# Patient Record
Sex: FEMALE | Race: WHITE | NOT HISPANIC OR LATINO | URBAN - METROPOLITAN AREA
[De-identification: names, ages, dates, MRNs, and addresses within clinical notes are randomized per-mention and may not be internally consistent; named-entity substitution may affect disease eponyms.]

---

## 2017-02-21 PROBLEM — R55 SYNCOPE: Status: ACTIVE | Noted: 2017-02-21

## 2017-05-08 ENCOUNTER — IMPORTED ENCOUNTER (OUTPATIENT)
Dept: URBAN - METROPOLITAN AREA CLINIC 1 | Facility: CLINIC | Age: 57
End: 2017-05-08

## 2017-05-08 PROBLEM — Z79.84: Noted: 2017-05-08

## 2017-05-08 PROBLEM — H25.813: Noted: 2017-05-08

## 2017-05-08 PROBLEM — E11.3521: Noted: 2017-05-08

## 2017-05-08 PROBLEM — E08.3513: Noted: 2017-05-08

## 2017-05-08 PROCEDURE — 92004 COMPRE OPH EXAM NEW PT 1/>: CPT

## 2017-05-08 NOTE — PATIENT DISCUSSION
1.  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 2.  DM Type II (Oral Medication) with Proliferative Diabetic Retinopathy with no Macular Edema OS: With Gross NVD and tractional band from the disc to the lens. Will refer to Retinal Specialist this week. Discussed the pathophysiology of diabetes and its effect on the eye and risk of blindness. Stressed the importance of strong glucose control. Advised of importance of at least yearly dilated examinations but to contact us immediately for any problems or concerns. 3. Cataract OU: Observe for now without intervention. The patient was advised to contact us if any change or worsening of visionReturn for an appointment in 6 months 10/DFE with Dr. Derrick Andrew.

## 2017-07-10 PROBLEM — G45.9 TIA (TRANSIENT ISCHEMIC ATTACK): Status: ACTIVE | Noted: 2017-07-10

## 2017-07-10 PROBLEM — R42 DIZZINESS: Status: ACTIVE | Noted: 2017-07-10

## 2018-05-27 PROBLEM — R53.1 LEFT-SIDED WEAKNESS: Status: ACTIVE | Noted: 2018-05-27

## 2018-07-02 ENCOUNTER — TELEPHONE (OUTPATIENT)
Dept: OBGYN CLINIC | Age: 58
End: 2018-07-02

## 2018-07-02 NOTE — TELEPHONE ENCOUNTER
Noted clear vaginal discharge when wiping a few days ago. Advised patient that clear discharge is normal. Would like to schedule for well woman exam. Transferred to [facility] for scheduling.

## 2018-09-24 ENCOUNTER — IMPORTED ENCOUNTER (OUTPATIENT)
Dept: URBAN - METROPOLITAN AREA CLINIC 1 | Facility: CLINIC | Age: 58
End: 2018-09-24

## 2018-09-24 NOTE — PATIENT DISCUSSION
Pt came in for emergency visit and mid exam while being worked up by tech patient started having a panic attack and was very disoriented. EMS was called and came on site and took patient via ambulance to the emergency room.

## 2018-09-26 ENCOUNTER — IMPORTED ENCOUNTER (OUTPATIENT)
Dept: URBAN - METROPOLITAN AREA CLINIC 1 | Facility: CLINIC | Age: 58
End: 2018-09-26

## 2018-09-26 PROBLEM — H25.813: Noted: 2018-09-26

## 2018-09-26 PROBLEM — E11.3512: Noted: 2018-09-26

## 2018-09-26 PROBLEM — E11.3521: Noted: 2018-09-26

## 2018-09-26 PROCEDURE — 92014 COMPRE OPH EXAM EST PT 1/>: CPT

## 2018-09-26 NOTE — PATIENT DISCUSSION
DM Type II with Proliferative Diabetic Retinopathy with Macular Edema OS:  Discussed the pathophysiology of diabetes and its effect on the eye and risk of blindness. Stressed the importance of strong glucose control. Advised of importance of at least yearly dilated examinations but to contact us immediately for any problems or concerns.

## 2018-09-26 NOTE — PATIENT DISCUSSION
1.  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 2.  DM Type II (currently not on any medication) with Proliferative Diabetic Retinopathy with Macular Edema OS:  Discussed the pathophysiology of diabetes and its effect on the eye and risk of blindness. Stressed the importance of strong glucose control. Advised of importance of at least yearly dilated examinations but to contact us immediately for any problems or concerns. 3. Cataracts OU (w/ PSC OD) -- Observe for now without intervention until cleared by retinal specialist. The patient was advised to contact us if any change or worsening of visionReturn for an appointment in 6 MO for a 10 / [address] with Dr. Viviana Nice. Refer back to Dr. Juanpablo Yan within 2-3 WKS for PDR / DME OU treatment & management & cataract surgery eval / clearance.

## 2018-10-29 ENCOUNTER — OFFICE VISIT (OUTPATIENT)
Dept: OBGYN CLINIC | Age: 58
End: 2018-10-29

## 2018-10-29 VITALS
WEIGHT: 123.6 LBS | RESPIRATION RATE: 16 BRPM | DIASTOLIC BLOOD PRESSURE: 65 MMHG | SYSTOLIC BLOOD PRESSURE: 120 MMHG | OXYGEN SATURATION: 100 % | HEART RATE: 109 BPM | TEMPERATURE: 98.4 F | BODY MASS INDEX: 20.59 KG/M2 | HEIGHT: 65 IN

## 2018-10-29 DIAGNOSIS — Z01.419 WELL WOMAN EXAM WITH ROUTINE GYNECOLOGICAL EXAM: Primary | ICD-10-CM

## 2018-10-29 DIAGNOSIS — Z01.419 WELL WOMAN EXAM WITH ROUTINE GYNECOLOGICAL EXAM: ICD-10-CM

## 2018-10-29 NOTE — PROGRESS NOTES
Subjective:  
66 y.o. female for Well Woman Check. No LMP recorded. Patient is not currently having periods (Reason: Menopause). Social History: single partner, contraception - none. Pertinent past medical hstory: no history of HTN, DVT, CAD, DM, liver disease, migraines or smoking. ROS:  Feeling well. No dyspnea or chest pain on exertion. No abdominal pain, change in bowel habits, black or bloody stools. No urinary tract symptoms. GYN ROS: no breast pain or new or enlarging lumps on self exam, no vaginal bleeding, no discharge or pelvic pain, no hot flashes. No neurological complaints. Objective:  
 
Visit Vitals /65 (BP 1 Location: Left arm, BP Patient Position: Sitting) Pulse (!) 109 Temp 98.4 °F (36.9 °C) (Oral) Resp 16 Ht 5' 5\" (1.651 m) Wt 123 lb 9.6 oz (56.1 kg) SpO2 100% BMI 20.57 kg/m² The patient appears well, alert, oriented x 3, in no distress. ENT normal.  Neck supple. No adenopathy or thyromegaly. CARMEN. Lungs are clear, good air entry, no wheezes, rhonchi or rales. S1 and S2 normal, no murmurs, regular rate and rhythm. Abdomen soft without tenderness, guarding, mass or organomegaly. Extremities show no edema, normal peripheral pulses. Neurological is normal, no focal findings. BREAST EXAM: breasts appear normal, no suspicious masses, no skin or nipple changes or axillary nodes PELVIC EXAM: normal external genitalia, vulva, vagina, cervix, uterus and adnexa Assessment/Plan:  
well woman 
mammogram 
pap smear 
return annually or prn 
  ICD-10-CM ICD-9-CM 1. Well woman exam with routine gynecological exam Z01.419 V72.31 PAP IG, APTIMA HPV AND RFX 16/18,45 ([id number]) PAP IG, APTIMA HPV AND RFX 16/18,45 ([id number]) Sophie Glynn

## 2018-10-29 NOTE — PATIENT INSTRUCTIONS
Well Visit, Women 48 to 72: Care Instructions Your Care Instructions Physical exams can help you stay healthy. Your doctor has checked your overall health and may have suggested ways to take good care of yourself. He or she also may have recommended tests. At home, you can help prevent illness with healthy eating, regular exercise, and other steps. Follow-up care is a key part of your treatment and safety. Be sure to make and go to all appointments, and call your doctor if you are having problems. It's also a good idea to know your test results and keep a list of the medicines you take. How can you care for yourself at home? · Reach and stay at a healthy weight. This will lower your risk for many problems, such as obesity, diabetes, heart disease, and high blood pressure. · Get at least 30 minutes of exercise on most days of the week. Walking is a good choice. You also may want to do other activities, such as running, swimming, cycling, or playing tennis or team sports. · Do not smoke. Smoking can make health problems worse. If you need help quitting, talk to your doctor about stop-smoking programs and medicines. These can increase your chances of quitting for good. · Protect your skin from too much sun. When you're outdoors from 10 a.m. to 4 p.m., stay in the shade or cover up with clothing and a hat with a wide brim. Wear sunglasses that block UV rays. Even when it's cloudy, put broad-spectrum sunscreen (SPF 30 or higher) on any exposed skin. · See a dentist one or two times a year for checkups and to have your teeth cleaned. · Wear a seat belt in the car. · Limit alcohol to 1 drink a day. Too much alcohol can cause health problems. Follow your doctor's advice about when to have certain tests. These tests can spot problems early. · Cholesterol.  Your doctor will tell you how often to have this done based on your age, family history, or other things that can increase your risk for heart attack and stroke. · Blood pressure. Have your blood pressure checked during a routine doctor visit. Your doctor will tell you how often to check your blood pressure based on your age, your blood pressure results, and other factors. · Mammogram. Ask your doctor how often you should have a mammogram, which is an X-ray of your breasts. A mammogram can spot breast cancer before it can be felt and when it is easiest to treat. · Pap test and pelvic exam. Ask your doctor how often you should have a Pap test. You may not need to have a Pap test as often as you used to. · Vision. Have your eyes checked every year or two or as often as your doctor suggests. Some experts recommend that you have yearly exams for glaucoma and other age-related eye problems starting at age 48. · Hearing. Tell your doctor if you notice any change in your hearing. You can have tests to find out how well you hear. · Diabetes. Ask your doctor whether you should have tests for diabetes. · Colon cancer. You should begin tests for colon cancer at age 48. You may have one of several tests. Your doctor will tell you how often to have tests based on your age and risk. Risks include whether you already had a precancerous polyp removed from your colon or whether your parents, sisters and brothers, or children have had colon cancer. · Thyroid disease. Talk to your doctor about whether to have your thyroid checked as part of a regular physical exam. Women have an increased chance of a thyroid problem. · Osteoporosis. You should begin tests for bone density at age 72. If you are younger than 72, ask your doctor whether you have factors that may increase your risk for this disease. You may want to have this test before age 72. · Heart attack and stroke risk. At least every 4 to 6 years, you should have your risk for heart attack and stroke assessed.  Your doctor uses factors such as your age, blood pressure, cholesterol, and whether you smoke or have diabetes to show what your risk for a heart attack or stroke is over the next 10 years. When should you call for help? Watch closely for changes in your health, and be sure to contact your doctor if you have any problems or symptoms that concern you. Where can you learn more? Go to http://rakel-lebron.info/. Enter O416 in the search box to learn more about \"Well Visit, Women 50 to 72: Care Instructions. \" Current as of: March 29, 2018 Content Version: 11.8 © 4373-6584 [employer]. Care instructions adapted under license by [employer] (which disclaims liability or warranty for this information). If you have questions about a medical condition or this instruction, always ask your healthcare professional. [address] any warranty or liability for your use of this information.

## 2018-11-03 LAB
CYTOLOGIST CVX/VAG CYTO: ABNORMAL
CYTOLOGY CVX/VAG DOC THIN PREP: ABNORMAL
DX ICD CODE: ABNORMAL
HPV GENOTYPE 18,45: NEGATIVE
HPV I/H RISK 4 DNA CVX QL PROBE+SIG AMP: POSITIVE
HPV16 DNA CVX QL PROBE+SIG AMP: NEGATIVE
Lab: ABNORMAL
OTHER STN SPEC: ABNORMAL
PATH REPORT.FINAL DX SPEC: ABNORMAL
STAT OF ADQ CVX/VAG CYTO-IMP: ABNORMAL

## 2018-11-19 NOTE — PATIENT DISCUSSION
"""Discussed right eye is worse than left eye.  Recommend she see retina specialist for evaluation ""

## 2018-12-01 ENCOUNTER — APPOINTMENT (OUTPATIENT)
Dept: CT IMAGING | Age: 58
End: 2018-12-01
Attending: EMERGENCY MEDICINE
Payer: MEDICARE

## 2018-12-01 ENCOUNTER — HOSPITAL ENCOUNTER (EMERGENCY)
Age: 58
Discharge: HOME | End: 2018-12-01
Attending: EMERGENCY MEDICINE
Payer: MEDICARE

## 2018-12-01 VITALS
TEMPERATURE: 98.3 F | DIASTOLIC BLOOD PRESSURE: 88 MMHG | HEART RATE: 89 BPM | BODY MASS INDEX: 19.99 KG/M2 | SYSTOLIC BLOOD PRESSURE: 152 MMHG | OXYGEN SATURATION: 100 % | RESPIRATION RATE: 13 BRPM | WEIGHT: 120 LBS | HEIGHT: 65 IN

## 2018-12-01 DIAGNOSIS — R73.9 HYPERGLYCEMIA: ICD-10-CM

## 2018-12-01 DIAGNOSIS — R55 SYNCOPE, UNSPECIFIED SYNCOPE TYPE: Primary | ICD-10-CM

## 2018-12-01 DIAGNOSIS — S09.90XA CLOSED HEAD INJURY, INITIAL ENCOUNTER: ICD-10-CM

## 2018-12-01 LAB
ANION GAP SERPL CALC-SCNC: 7 MMOL/L (ref 3–18)
BASOPHILS # BLD: 0 K/UL (ref 0–0.1)
BASOPHILS NFR BLD: 0 % (ref 0–2)
BUN SERPL-MCNC: 19 MG/DL (ref 7–18)
BUN/CREAT SERPL: 19 (ref 12–20)
CALCIUM SERPL-MCNC: 9.3 MG/DL (ref 8.5–10.1)
CHLORIDE SERPL-SCNC: 101 MMOL/L (ref 100–108)
CK MB CFR SERPL CALC: 2 % (ref 0–4)
CK MB SERPL-MCNC: 1 NG/ML (ref 5–25)
CK SERPL-CCNC: 51 U/L (ref 26–192)
CO2 SERPL-SCNC: 27 MMOL/L (ref 21–32)
CREAT SERPL-MCNC: 0.98 MG/DL (ref 0.6–1.3)
DIFFERENTIAL METHOD BLD: NORMAL
EOSINOPHIL # BLD: 0.1 K/UL (ref 0–0.4)
EOSINOPHIL NFR BLD: 1 % (ref 0–5)
ERYTHROCYTE [DISTWIDTH] IN BLOOD BY AUTOMATED COUNT: 12.2 % (ref 11.6–14.5)
GLUCOSE BLD STRIP.AUTO-MCNC: 309 MG/DL (ref 70–110)
GLUCOSE BLD STRIP.AUTO-MCNC: 508 MG/DL (ref 70–110)
GLUCOSE SERPL-MCNC: 455 MG/DL (ref 74–99)
HCT VFR BLD AUTO: 41.5 % (ref 35–45)
HGB BLD-MCNC: 14.7 G/DL (ref 12–16)
LYMPHOCYTES # BLD: 2 K/UL (ref 0.9–3.6)
LYMPHOCYTES NFR BLD: 33 % (ref 21–52)
MCH RBC QN AUTO: 29.4 PG (ref 24–34)
MCHC RBC AUTO-ENTMCNC: 35.4 G/DL (ref 31–37)
MCV RBC AUTO: 83 FL (ref 74–97)
MONOCYTES # BLD: 0.5 K/UL (ref 0.05–1.2)
MONOCYTES NFR BLD: 8 % (ref 3–10)
NEUTS SEG # BLD: 3.4 K/UL (ref 1.8–8)
NEUTS SEG NFR BLD: 58 % (ref 40–73)
PLATELET # BLD AUTO: 323 K/UL (ref 135–420)
PMV BLD AUTO: 9.7 FL (ref 9.2–11.8)
POTASSIUM SERPL-SCNC: 4.4 MMOL/L (ref 3.5–5.5)
RBC # BLD AUTO: 5 M/UL (ref 4.2–5.3)
SODIUM SERPL-SCNC: 135 MMOL/L (ref 136–145)
TROPONIN I SERPL-MCNC: <0.02 NG/ML (ref 0–0.04)
WBC # BLD AUTO: 6 K/UL (ref 4.6–13.2)

## 2018-12-01 PROCEDURE — 82962 GLUCOSE BLOOD TEST: CPT

## 2018-12-01 PROCEDURE — 96361 HYDRATE IV INFUSION ADD-ON: CPT

## 2018-12-01 PROCEDURE — 99285 EMERGENCY DEPT VISIT HI MDM: CPT

## 2018-12-01 PROCEDURE — 85025 COMPLETE CBC W/AUTO DIFF WBC: CPT

## 2018-12-01 PROCEDURE — 93005 ELECTROCARDIOGRAM TRACING: CPT

## 2018-12-01 PROCEDURE — 82553 CREATINE MB FRACTION: CPT

## 2018-12-01 PROCEDURE — 94762 N-INVAS EAR/PLS OXIMTRY CONT: CPT

## 2018-12-01 PROCEDURE — 96360 HYDRATION IV INFUSION INIT: CPT

## 2018-12-01 PROCEDURE — 80048 BASIC METABOLIC PNL TOTAL CA: CPT

## 2018-12-01 PROCEDURE — 70450 CT HEAD/BRAIN W/O DYE: CPT

## 2018-12-01 PROCEDURE — 74011250636 HC RX REV CODE- 250/636: Performed by: EMERGENCY MEDICINE

## 2018-12-01 RX ADMIN — SODIUM CHLORIDE 1000 ML: 900 INJECTION, SOLUTION INTRAVENOUS at 17:31

## 2018-12-01 RX ADMIN — SODIUM CHLORIDE 1000 ML: 900 INJECTION, SOLUTION INTRAVENOUS at 16:20

## 2018-12-01 NOTE — ED PROVIDER NOTES
EMERGENCY DEPARTMENT HISTORY AND PHYSICAL EXAM 
 
4:06 PM 
 
 
Date: 12/1/2018 Patient Name: Candance Brunette 
 
History of Presenting Illness Chief Complaint Patient presents with  Syncope  Headache  Fall  Dizziness History Provided By: Patient Chief Complaint: dizziness Duration:  Hours Timing:  Acute Location: head Quality: Aching Severity: Moderate Modifying Factors: none Associated Symptoms: possible syncopal episode, HA, anxious, decreased appetite (due to anxiety) Additional History (Context): Candance Brunette is a 66 y.o. female with h/o vertigo, stroke, DVT, DM, and anxiety who presents with sudden onset dizziness today. The pt has been upstairs w/ her  that is admitted in the hospital. This morning she was upstair washing; says when she bent over she hit the front of her head on the sink and when she went to stand up she hit the back of her head. Now having moderate frontal HA. While she was still up stairs she asked one of nurses to look at her head; while she was sitting in the chair she was still feeling dizzy but was told she passed out but she doesn't remember this happening. Notes since her  been in the hospital her anxiety has been bothering her and she hasn't been eating as she should. Also reports she is diabetic and has not been taking her medications as she should due to the stressful situation; hasn't checked her BS in a while. She is legally blind but can see shadows and lights in her right eye. Otherwise fine. Denies CP, SOB, fever, chills, N/V, back pain, neck pain, abnormal gait, weakness, numbness, or any other associated sx. No other complaints or concerns. PCP: Haily Martinez MD 
 
 
Past History Past Medical History: 
Past Medical History:  
Diagnosis Date  Anxiety  Blind in both eyes  Diabetes ([facility] [address]  Ill-defined condition  Migraines  Right leg DVT ([facility] [address]  Stroke [facility]) 2007  Vertigo Past Surgical History: 
Past Surgical History:  
Procedure Laterality Date  HX BACK SURGERY    
 2 slipped disc with pinched spinal cord  HX ORTHOPAEDIC Left  HX OTHER SURGICAL    
 left hand  HX TUBAL LIGATION Family History: No family history on file. Social History: 
Social History Tobacco Use  Smoking status: Former Smoker  Smokeless tobacco: Never Used Substance Use Topics  Alcohol use: No  
 Drug use: No  
 
 
Allergies: Allergies Allergen Reactions  Latex Hives  Albuterol Sulfate Swelling  Aspirin Anaphylaxis  Benadr [Diphenhydramine Hcl] Anaphylaxis  Morphine Anaphylaxis  Pcn [Penicillins] Anaphylaxis  Ciprofloxacin Unknown (comments)  Demerol [Meperidine] Unknown (comments)  Erythromycin Base Unknown (comments)  Flexeril [Cyclobenzaprine] Unknown (comments)  Iodine And Iodide Containing Products Unknown (comments)  Keflex [Cephalexin] Unknown (comments)  Macrobid [Nitrofurantoin Monohyd/M-Cryst] Unknown (comments)  Macrodantin [Nitrofurantoin Macrocrystalline] Unknown (comments)  Motrin [Ibuprofen] Unknown (comments)  Nitroglycerin Unknown (comments)  Nsaids (Non-Steroidal Anti-Inflammatory Drug) Unknown (comments)  Prednisone Unknown (comments)  Sulfamethoxazole Unknown (comments)  Tramadol Unknown (comments)  Zithromax [Azithromycin] Unknown (comments) Review of Systems Review of Systems Constitutional: Positive for appetite change (due to anxiety). Negative for chills and fever. HENT: Negative for congestion, rhinorrhea, sore throat and trouble swallowing. Eyes: Negative for redness and visual disturbance. Respiratory: Negative for cough, shortness of breath and wheezing. Cardiovascular: Negative for chest pain. Gastrointestinal: Negative for abdominal pain, nausea and vomiting. Endocrine: Negative for polyuria. Genitourinary: Negative for difficulty urinating and dysuria. Musculoskeletal: Negative for arthralgias and neck stiffness. Skin: Negative for pallor and rash. Neurological: Positive for dizziness, syncope (possible) and headaches (frontal). Negative for weakness and numbness. Hematological: Does not bruise/bleed easily. Psychiatric/Behavioral: Negative for confusion, dysphoric mood and suicidal ideas. The patient is nervous/anxious. All other systems reviewed and are negative. Physical Exam  
 
Visit Vitals /75 Pulse 84 Temp 98.3 °F (36.8 °C) Resp 18 Ht 5' 5\" (1.651 m) Wt 54.4 kg (120 lb) SpO2 100% BMI 19.97 kg/m² Physical Exam  
Constitutional: She is oriented to person, place, and time. She appears well-developed and well-nourished. No distress. HENT:  
Head: Normocephalic and atraumatic. Mouth/Throat: Oropharynx is clear and moist.  
Eyes: Conjunctivae are normal. Pupils are equal, round, and reactive to light. No scleral icterus. Neck: Normal range of motion. Neck supple. Cardiovascular: Normal rate and intact distal pulses. Capillary refill < 3 seconds Pulmonary/Chest: Effort normal and breath sounds normal. No respiratory distress. She has no wheezes. Abdominal: Soft. Bowel sounds are normal. She exhibits no distension. There is no tenderness. Musculoskeletal: Normal range of motion. She exhibits no edema. Lymphadenopathy:  
  She has no cervical adenopathy. Neurological: She is alert and oriented to person, place, and time. She has normal strength. No cranial nerve deficit or sensory deficit. Strength 5/5. Sensation intact. No cerebellar ataxia. Skin: Skin is warm and dry. She is not diaphoretic. Nursing note and vitals reviewed. Diagnostic Study Results Labs - Recent Results (from the past 12 hour(s)) GLUCOSE, POC Collection Time: 12/01/18  4:19 PM  
Result Value Ref Range Glucose (POC) 508 (HH) 70 - 110 mg/dL CBC WITH AUTOMATED DIFF Collection Time: 12/01/18  4:21 PM  
Result Value Ref Range WBC 6.0 4.6 - 13.2 K/uL  
 RBC 5.00 4.20 - 5.30 M/uL  
 HGB 14.7 12.0 - 16.0 g/dL HCT 41.5 35.0 - 45.0 % MCV 83.0 74.0 - 97.0 FL  
 MCH 29.4 24.0 - 34.0 PG  
 MCHC 35.4 31.0 - 37.0 g/dL  
 RDW 12.2 11.6 - 14.5 % PLATELET 185 084 - 872 K/uL MPV 9.7 9.2 - 11.8 FL  
 NEUTROPHILS 58 40 - 73 % LYMPHOCYTES 33 21 - 52 % MONOCYTES 8 3 - 10 % EOSINOPHILS 1 0 - 5 % BASOPHILS 0 0 - 2 %  
 ABS. NEUTROPHILS 3.4 1.8 - 8.0 K/UL  
 ABS. LYMPHOCYTES 2.0 0.9 - 3.6 K/UL  
 ABS. MONOCYTES 0.5 0.05 - 1.2 K/UL  
 ABS. EOSINOPHILS 0.1 0.0 - 0.4 K/UL  
 ABS. BASOPHILS 0.0 0.0 - 0.1 K/UL  
 DF AUTOMATED METABOLIC PANEL, BASIC Collection Time: 12/01/18  4:21 PM  
Result Value Ref Range Sodium 135 (L) 136 - 145 mmol/L Potassium 4.4 3.5 - 5.5 mmol/L Chloride 101 100 - 108 mmol/L  
 CO2 27 21 - 32 mmol/L Anion gap 7 3.0 - 18 mmol/L Glucose 455 (HH) 74 - 99 mg/dL BUN 19 (H) 7.0 - 18 MG/DL Creatinine 0.98 0.6 - 1.3 MG/DL  
 BUN/Creatinine ratio 19 12 - 20 GFR est AA >60 >60 ml/min/1.73m2 GFR est non-AA 59 (L) >60 ml/min/1.73m2 Calcium 9.3 8.5 - 10.1 MG/DL  
CARDIAC PANEL,(CK, CKMB & TROPONIN) Collection Time: 12/01/18  4:21 PM  
Result Value Ref Range CK 51 26 - 192 U/L  
 CK - MB 1.0 <3.6 ng/ml CK-MB Index 2.0 0.0 - 4.0 % Troponin-I, Qt. <0.02 0.0 - 0.045 NG/ML  
EKG, 12 LEAD, INITIAL Collection Time: 12/01/18  4:25 PM  
Result Value Ref Range Ventricular Rate 87 BPM  
 Atrial Rate 87 BPM  
 P-R Interval 114 ms QRS Duration 84 ms Q-T Interval 390 ms QTC Calculation (Bezet) 469 ms Calculated P Axis 42 degrees Calculated R Axis 61 degrees Calculated T Axis 59 degrees Diagnosis Normal sinus rhythm Normal ECG When compared with ECG of 15-SEP-2010 20:41, No significant change was found GLUCOSE, POC  Collection Time: 12/01/18  6:26 PM  
 Result Value Ref Range Glucose (POC) 309 (H) 70 - 110 mg/dL Radiologic Studies -  
CT HEAD WO CONT Final Result Impression:  
 IMPRESSION: No acute findings. The brain looks normal.  
  
 
 
 
Medical Decision Making I am the first provider for this patient. I reviewed the vital signs, available nursing notes, past medical history, past surgical history, family history and social history. Vital Signs-Reviewed the patient's vital signs. Pulse Oximetry Analysis - 99% RA 
 
EKG: Interpreted by the EP. Time Interpreted: 425 pm 
 Rate: 87 Rhythm: Normal Sinus Rhythm Interpretation:Normal axis. ST elevation or depressions. No ectopy Records Reviewed: Nursing Notes and Old Medical Records (Time of Review: 4:06 PM) Provider Notes (Medical Decision Making): MDM Number of Diagnoses or Management Options Diagnosis management comments: Ddx: metabolic, vasovagal response, orthostatic HTN, closed head injury, ICH, cardiac Will give IVF, check labs, and get CT head. Medications  
sodium chloride 0.9 % bolus infusion 1,000 mL (0 mL IntraVENous IV Completed 12/1/18 5450) sodium chloride 0.9 % bolus infusion 1,000 mL (0 mL IntraVENous IV Completed 12/1/18 5981) ED Course: Progress Notes, Reevaluation, and Consults: 
6:22 PM Discussed giving the pt insulin for the second time and she still adamantly refuses. States she knows  her BS is high. I explained to her if her BS remains uncontrolled  It could lead to medical issues including Stroke MI and death. She says she will f/u with pcp; hasn't been taking metformin since her  has been ill in the hospital. Her potassium 4.4, Co2 normal and other electrolytes unremarkable. Will recheck blood sugar which she agreed to after receiving 2L of IVF. Remains grossly intact. Pt sitting up talking to her friend and in no distress. Repeat  I have reassessed the patient.  I have discussed the workup, results and plan with the patient and patient is in agreement. Patient is feeling much better. Patient was discharge in stable condition. Patient was given outpatient follow up. Patient is to return to emergency department if any new or worsening condition. Diagnosis Clinical Impression: 1. Syncope, unspecified syncope type 2. Closed head injury, initial encounter 3. Hyperglycemia Disposition: home Follow-up Information Follow up With Specialties Details Why Contact Info To, Haily Villalobos MD Family Practice Schedule an appointment as soon as possible for a visit in 2 days  [address] [place] [address] [address] [address] 
[phone] [facility] EMERGENCY DEPT Emergency Medicine  As needed [address] [facility] [address] 
[phone] Medication List  
  
ASK your doctor about these medications * Blood-Glucose Meter monitoring kit Commonly known as:  FREESTYLE SYSTEM KIT Use as directed * Blood-Glucose Meter monitoring kit Please specify brand: Freestyle Lite 
  
glipiZIDE 10 mg tablet Commonly known as:  Grace Poot Take 1 Tab by mouth two (2) times a day. glucose blood VI test strips strip Commonly known as:  blood glucose test 
Please specify brand:Freestyle Lite Test Strips (use with Freestyle Freedom Lite or Freestyle Lite blood glucose meters) Lancets Misc Check blood glucose 3 times before meals 
  
metFORMIN 1,000 mg tablet Commonly known as:  GLUCOPHAGE Take 1 Tab by mouth two (2) times daily (with meals). * This list has 2 medication(s) that are the same as other medications prescribed for you. Read the directions carefully, and ask your doctor or other care provider to review them with you.  
  
  
  
 
_______________________________ Attestations: 
Scribe Attestation Valerio Meehan acting as a scribe for and in the presence of Danny Anderson, DO     
December 01, 2018 at 4:11 PM 
    
Provider Attestation: I personally performed the services described in the documentation, reviewed the documentation, as recorded by the scribe in my presence, and it accurately and completely records my words and actions. December 01, 2018 at [place] [address] Danial Jaimes DO   
_______________________________

## 2018-12-01 NOTE — DISCHARGE INSTRUCTIONS
If you were prescribed any medication take as directed. Do not drive or use heavy equipment if prescribed narcotics. Follow up with your primary care physician or with specialist as directed. Return to the emergency room with any new or worsening conditions. Fainting: Care Instructions  Your Care Instructions    When you faint, or pass out, you lose consciousness for a short time. A brief drop in blood flow to the brain often causes it. When you fall or lie down, more blood flows to your brain and you regain consciousness. Emotional stress, pain, or overheating--especially if you have been standing--can make you faint. In these cases, fainting is usually not serious. But fainting can be a sign of a more serious problem. Your doctor may want you to have more tests to rule out other causes. The treatment you need depends on the reason why you fainted. The doctor has checked you carefully, but problems can develop later. If you notice any problems or new symptoms, get medical treatment right away. Follow-up care is a key part of your treatment and safety. Be sure to make and go to all appointments, and call your doctor if you are having problems. It's also a good idea to know your test results and keep a list of the medicines you take. How can you care for yourself at home? · Drink plenty of fluids to prevent dehydration. If you have kidney, heart, or liver disease and have to limit fluids, talk with your doctor before you increase your fluid intake. When should you call for help? Call 911 anytime you think you may need emergency care. For example, call if:    · You have symptoms of a heart problem. These may include:  ? Chest pain or pressure. ? Severe trouble breathing. ? A fast or irregular heartbeat. ? Lightheadedness or sudden weakness. ? Coughing up pink, foamy mucus. ? Passing out. After you call 911, the  may tell you to chew 1 adult-strength or 2 to 4 low-dose aspirin.  Wait for an ambulance. Do not try to drive yourself.     · You have symptoms of a stroke. These may include:  ? Sudden numbness, tingling, weakness, or loss of movement in your face, arm, or leg, especially on only one side of your body. ? Sudden vision changes. ? Sudden trouble speaking. ? Sudden confusion or trouble understanding simple statements. ? Sudden problems with walking or balance. ? A sudden, severe headache that is different from past headaches.     · You passed out (lost consciousness) again.    Watch closely for changes in your health, and be sure to contact your doctor if:    · You do not get better as expected. Where can you learn more? Go to http://rakel-lebron.info/. Enter Y813 in the search box to learn more about \"Fainting: Care Instructions. \"  Current as of: November 20, 2017  Content Version: 11.8  © 8145-1794 [employer]. Care instructions adapted under license by [employer] (which disclaims liability or warranty for this information). If you have questions about a medical condition or this instruction, always ask your healthcare professional. [address] any warranty or liability for your use of this information. Learning About a Closed Head Injury  What is a closed head injury? A closed head injury happens when your head gets hit hard. The strong force of the blow causes your brain to shake in your skull. This movement can cause the brain to bruise, swell, or tear. Sometimes nerves or blood vessels also get damaged. This can cause bleeding in or around the brain. A concussion is a type of closed head injury. What are the symptoms? If you have a mild concussion, you may have a mild headache or feel \"not quite right. \" These symptoms are common. They usually go away over a few days to 4 weeks. But sometimes after a concussion, you feel like you can't function as well as before the injury.  And you have new symptoms. This is called postconcussive syndrome. You may:  · Find it harder to solve problems, think, concentrate, or remember. · Have headaches. · Have changes in your sleep patterns, such as not being able to sleep or sleeping all the time. · Have changes in your personality. · Not be interested in your usual activities. · Feel angry or anxious without a clear reason. · Lose your sense of taste or smell. · Be dizzy, lightheaded, or unsteady. It may be hard to stand or walk. How is a closed head injury treated? Any person who may have a concussion needs to see a doctor. Some people have to stay in the hospital to be watched. Others can go home safely. If you go home, follow your doctor's instructions. He or she will tell you if you need someone to watch you closely for the next 24 hours or longer. Rest is the best treatment. Get plenty of sleep at night. And try to rest during the day. · Avoid activities that are physically or mentally demanding. These include housework, exercise, and schoolwork. And don't play video games, send text messages, or use the computer. You may need to change your school or work schedule to be able to avoid these activities. · Ask your doctor when it's okay to drive, ride a bike, or operate machinery. · Take an over-the-counter pain medicine, such as acetaminophen (Tylenol), ibuprofen (Advil, Motrin), or naproxen (Aleve). Be safe with medicines. Read and follow all instructions on the label. · Check with your doctor before you use any other medicines for pain. · Do not drink alcohol or use illegal drugs. They can slow recovery. They can also increase your risk of getting a second head injury. Follow-up care is a key part of your treatment and safety. Be sure to make and go to all appointments, and call your doctor if you are having problems. It's also a good idea to know your test results and keep a list of the medicines you take. Where can you learn more?   Go to http://rakel-lebron.info/. Enter E235 in the search box to learn more about \"Learning About a Closed Head Injury. \"  Current as of: June 4, 2018  Content Version: 11.8  © 2006-2018 [employer]. Care instructions adapted under license by [employer] (which disclaims liability or warranty for this information). If you have questions about a medical condition or this instruction, always ask your healthcare professional. [place] [address] any warranty or liability for your use of this information. Learning About High Blood Sugar  What is high blood sugar? Your body turns the food you eat into glucose (sugar), which it uses for energy. But if your body isn't able to use the sugar right away, it can build up in your blood and lead to high blood sugar. When the amount of sugar in your blood stays too high for too much of the time, you may have diabetes. Diabetes is a disease that can cause serious health problems. The good news is that lifestyle changes may help you get your blood sugar back to normal and avoid or delay diabetes. What causes high blood sugar? Sugar (glucose) can build up in your blood if you:  · Are overweight. · Have a family history of diabetes. · Take certain medicines, such as steroids. What are the symptoms? Having high blood sugar may not cause any symptoms at all. Or it may make you feel very thirsty or very hungry. You may also urinate more often than usual, have blurry vision, or lose weight without trying. How is high blood sugar treated? You can take steps to lower your blood sugar level if you understand what makes it get higher. Your doctor may want you to learn how to test your blood sugar level at home. Then you can see how illness, stress, or different kinds of food or medicine raise or lower your blood sugar level. Other tests may be needed to see if you have diabetes.   How can you prevent high blood sugar?  · Watch your weight. If you're overweight, losing just a small amount of weight may help. Reducing fat around your waist is most important. · Limit the amount of calories, sweets, and unhealthy fat you eat. Ask your doctor if a dietitian can help you. A registered dietitian can help you create meal plans that fit your lifestyle. · Get at least 30 minutes of exercise on most days of the week. Exercise helps control your blood sugar. It also helps you maintain a healthy weight. Walking is a good choice. You also may want to do other activities, such as running, swimming, cycling, or playing tennis or team sports. · If your doctor prescribed medicines, take them exactly as prescribed. Call your doctor if you think you are having a problem with your medicine. You will get more details on the specific medicines your doctor prescribes. Follow-up care is a key part of your treatment and safety. Be sure to make and go to all appointments, and call your doctor if you are having problems. It's also a good idea to know your test results and keep a list of the medicines you take. Where can you learn more? Go to http://rakel-lebron.info/. Enter O108 in the search box to learn more about \"Learning About High Blood Sugar. \"  Current as of: December 7, 2017  Content Version: 11.8  © 5827-7327 [employer]. Care instructions adapted under license by [employer] (which disclaims liability or warranty for this information). If you have questions about a medical condition or this instruction, always ask your healthcare professional. [address] any warranty or liability for your use of this information.

## 2018-12-01 NOTE — ED TRIAGE NOTES
Patient received after visiting ,fainted and dizzy. Diabetic , has not been eating or drinking. Bumped head on sink this morning twice.

## 2018-12-02 LAB
ATRIAL RATE: 87 BPM
CALCULATED P AXIS, ECG09: 42 DEGREES
CALCULATED R AXIS, ECG10: 61 DEGREES
CALCULATED T AXIS, ECG11: 59 DEGREES
DIAGNOSIS, 93000: NORMAL
P-R INTERVAL, ECG05: 114 MS
Q-T INTERVAL, ECG07: 390 MS
QRS DURATION, ECG06: 84 MS
QTC CALCULATION (BEZET), ECG08: 469 MS
VENTRICULAR RATE, ECG03: 87 BPM

## 2019-05-06 ENCOUNTER — APPOINTMENT (OUTPATIENT)
Dept: CT IMAGING | Age: 59
End: 2019-05-06
Attending: EMERGENCY MEDICINE
Payer: MEDICARE

## 2019-05-06 ENCOUNTER — HOSPITAL ENCOUNTER (EMERGENCY)
Age: 59
Discharge: HOME | End: 2019-05-06
Attending: EMERGENCY MEDICINE
Payer: MEDICARE

## 2019-05-06 VITALS
OXYGEN SATURATION: 100 % | RESPIRATION RATE: 18 BRPM | HEART RATE: 80 BPM | DIASTOLIC BLOOD PRESSURE: 73 MMHG | SYSTOLIC BLOOD PRESSURE: 147 MMHG | TEMPERATURE: 98 F

## 2019-05-06 DIAGNOSIS — R20.2 NUMBNESS AND TINGLING OF RIGHT FACE: ICD-10-CM

## 2019-05-06 DIAGNOSIS — R47.9 TRANSIENT SPEECH DISTURBANCE: ICD-10-CM

## 2019-05-06 DIAGNOSIS — R55 NEAR SYNCOPE: Primary | ICD-10-CM

## 2019-05-06 DIAGNOSIS — R20.0 NUMBNESS AND TINGLING OF RIGHT FACE: ICD-10-CM

## 2019-05-06 LAB
AMPHET UR QL SCN: NEGATIVE
ANION GAP SERPL CALC-SCNC: 7 MMOL/L (ref 3–18)
BARBITURATES UR QL SCN: NEGATIVE
BASOPHILS # BLD: 0 K/UL (ref 0–0.1)
BASOPHILS NFR BLD: 0 % (ref 0–2)
BENZODIAZ UR QL: NEGATIVE
BUN SERPL-MCNC: 12 MG/DL (ref 7–18)
BUN/CREAT SERPL: 17 (ref 12–20)
CALCIUM SERPL-MCNC: 8.8 MG/DL (ref 8.5–10.1)
CANNABINOIDS UR QL SCN: NEGATIVE
CHLORIDE SERPL-SCNC: 102 MMOL/L (ref 100–108)
CK MB CFR SERPL CALC: NORMAL % (ref 0–4)
CK MB SERPL-MCNC: <1 NG/ML (ref 5–25)
CK SERPL-CCNC: 56 U/L (ref 26–192)
CO2 SERPL-SCNC: 32 MMOL/L (ref 21–32)
COCAINE UR QL SCN: NEGATIVE
CREAT SERPL-MCNC: 0.72 MG/DL (ref 0.6–1.3)
DIFFERENTIAL METHOD BLD: NORMAL
EOSINOPHIL # BLD: 0.1 K/UL (ref 0–0.4)
EOSINOPHIL NFR BLD: 1 % (ref 0–5)
ERYTHROCYTE [DISTWIDTH] IN BLOOD BY AUTOMATED COUNT: 12.2 % (ref 11.6–14.5)
GLUCOSE BLD STRIP.AUTO-MCNC: 280 MG/DL (ref 70–110)
GLUCOSE SERPL-MCNC: 319 MG/DL (ref 74–99)
HCT VFR BLD AUTO: 38.1 % (ref 35–45)
HDSCOM,HDSCOM: NORMAL
HGB BLD-MCNC: 13 G/DL (ref 12–16)
LYMPHOCYTES # BLD: 1.5 K/UL (ref 0.9–3.6)
LYMPHOCYTES NFR BLD: 26 % (ref 21–52)
MCH RBC QN AUTO: 29.1 PG (ref 24–34)
MCHC RBC AUTO-ENTMCNC: 34.1 G/DL (ref 31–37)
MCV RBC AUTO: 85.4 FL (ref 74–97)
METHADONE UR QL: NEGATIVE
MONOCYTES # BLD: 0.4 K/UL (ref 0.05–1.2)
MONOCYTES NFR BLD: 8 % (ref 3–10)
NEUTS SEG # BLD: 3.7 K/UL (ref 1.8–8)
NEUTS SEG NFR BLD: 65 % (ref 40–73)
OPIATES UR QL: NEGATIVE
PCP UR QL: NEGATIVE
PLATELET # BLD AUTO: 298 K/UL (ref 135–420)
PMV BLD AUTO: 9.7 FL (ref 9.2–11.8)
POTASSIUM SERPL-SCNC: 4 MMOL/L (ref 3.5–5.5)
RBC # BLD AUTO: 4.46 M/UL (ref 4.2–5.3)
SODIUM SERPL-SCNC: 141 MMOL/L (ref 136–145)
TROPONIN I SERPL-MCNC: <0.02 NG/ML (ref 0–0.04)
WBC # BLD AUTO: 5.7 K/UL (ref 4.6–13.2)

## 2019-05-06 PROCEDURE — 80048 BASIC METABOLIC PNL TOTAL CA: CPT

## 2019-05-06 PROCEDURE — 70450 CT HEAD/BRAIN W/O DYE: CPT

## 2019-05-06 PROCEDURE — 85025 COMPLETE CBC W/AUTO DIFF WBC: CPT

## 2019-05-06 PROCEDURE — 82550 ASSAY OF CK (CPK): CPT

## 2019-05-06 PROCEDURE — 99285 EMERGENCY DEPT VISIT HI MDM: CPT

## 2019-05-06 PROCEDURE — 80307 DRUG TEST PRSMV CHEM ANLYZR: CPT

## 2019-05-06 PROCEDURE — 82962 GLUCOSE BLOOD TEST: CPT

## 2019-05-06 NOTE — ED PROVIDER NOTES
[facility] EMERGENCY DEPT 
 
 
3:44 PM 
 
Date: 5/6/2019 Patient Name: Tj Rahman 
 
History of Presenting Illness No chief complaint on file. 66 y.o. female with noted past medical history who presents to the emergency department with right face numbness, near syncope, and change mentation. Per the patient and fire rescue, the patient was in her usual state of health until this morning. She has had a history of prior TIAs as well as a prior CVA with some minimal left-sided residual weakness. Of note she is also legally blind. Today the patient was out walking with her daughter when she had a fairly sudden onset of some altered mental status which included her repeating her daughter Kaylyn Hamilton need to do the dishes\". After repeating that several times the daughter got worried and called fire rescue. Upon arrival of the pain, the patient had a near syncopal event. She is brought to the ER for evaluation. Upon evaluation the patient she notes that she has had right facial numbness since about 8:00 this morning. At that point face felt severely numb and now she states that it only minimally numb somewhere between a \"0 and 1 out of 10\". She has no other neurological deficits including no slurred speech noted weakness. No change in gait. Patient denies any other associated signs or symptoms. Patient denies any other complaints. Nursing notes regarding the HPI and triage nursing notes were reviewed. Prior medical records were reviewed. Current Outpatient Medications Medication Sig Dispense Refill  acetaminophen (TYLENOL) 325 mg tablet Take 2 Tabs by mouth every four (4) hours as needed for Pain. 20 Tab 0  
 methocarbamol (ROBAXIN) 500 mg tablet Take 1 Tab by mouth four (4) times daily. 20 Tab 0  
 glipiZIDE (GLUCOTROL) 10 mg tablet Take 1 Tab by mouth two (2) times a day.  60 Tab 0  
 metFORMIN (GLUCOPHAGE) 1,000 mg tablet Take 1 Tab by mouth two (2) times daily (with meals). 60 Tab 0  Blood-Glucose Meter monitoring kit Please specify brand: Freestyle Lite 1 Kit 0  
 glucose blood VI test strips (BLOOD GLUCOSE TEST) strip Please specify brand:Freestyle Lite Test Strips (use with Freestyle Freedom Lite or Freestyle Lite blood glucose meters) 90 Strip 0  
 Lancets misc Check blood glucose 3 times before meals 60 Each 0  Blood-Glucose Meter (FREESTYLE SYSTEM KIT) monitoring kit Use as directed 1 Kit 0 Past History Past Medical History: 
Past Medical History:  
Diagnosis Date  Anxiety  Blind in both eyes  Diabetes ([facility] [address]  Ill-defined condition  Migraines  Right leg DVT ([facility] [address]  Stroke [facility]) 2007  Vertigo Past Surgical History: 
Past Surgical History:  
Procedure Laterality Date  HX BACK SURGERY    
 2 slipped disc with pinched spinal cord  HX ORTHOPAEDIC Left  HX OTHER SURGICAL    
 left hand  HX TUBAL LIGATION Family History: No family history on file. Social History: 
Social History Tobacco Use  Smoking status: Former Smoker  Smokeless tobacco: Never Used Substance Use Topics  Alcohol use: No  
 Drug use: No  
 
 
Allergies: Allergies Allergen Reactions  Latex Hives  Albuterol Sulfate Swelling  Aspirin Anaphylaxis  Benadr [Diphenhydramine Hcl] Anaphylaxis  Morphine Anaphylaxis  Pcn [Penicillins] Anaphylaxis  Ciprofloxacin Unknown (comments)  Demerol [Meperidine] Unknown (comments)  Erythromycin Base Unknown (comments)  Flexeril [Cyclobenzaprine] Unknown (comments)  Iodine And Iodide Containing Products Unknown (comments)  Keflex [Cephalexin] Unknown (comments)  Macrobid [Nitrofurantoin Monohyd/M-Cryst] Unknown (comments)  Macrodantin [Nitrofurantoin Macrocrystalline] Unknown (comments)  Motrin [Ibuprofen] Unknown (comments)  Nitroglycerin Unknown (comments)  Nsaids (Non-Steroidal Anti-Inflammatory Drug) Unknown (comments)  Prednisone Unknown (comments)  Sulfamethoxazole Unknown (comments)  Tramadol Unknown (comments)  Zithromax [Azithromycin] Unknown (comments) Patient's primary care provider (as noted in EPIC): Rafa Martinez MD 
 
Review of Systems Constitutional: Negative for diaphoresis. HENT: Negative for congestion. Eyes: Negative for discharge. Respiratory: Negative for stridor. Cardiovascular: Negative for palpitations. Gastrointestinal: Negative for diarrhea. Endocrine: Negative for heat intolerance. Genitourinary: Negative for flank pain. Musculoskeletal: Negative for back pain. Neurological: Positive for numbness. Negative for dizziness, tremors, seizures, syncope, facial asymmetry, weakness, light-headedness and headaches. Psychiatric/Behavioral: Negative for hallucinations. All other systems reviewed and are negative. Visit Vitals /78 SpO2 100% PHYSICAL EXAM: 
 
CONSTITUTIONAL:  Alert, in no apparent distress;  well developed;  well nourished. HEAD:  Normocephalic, atraumatic. EYES:  EOMI. Non-icteric sclera. Normal conjunctiva. ENTM:  Nose:  no rhinorrhea. Throat:  no erythema or exudate, mucous membranes moist. 
NECK:  No JVD. Supple RESPIRATORY:  Chest clear, equal breath sounds, good air movement. CARDIOVASCULAR:  Regular rate and rhythm. No murmurs, rubs, or gallops. GI:  Normal bowel sounds, abdomen soft and non-tender. No rebound or guarding. BACK:  Non-tender. UPPER EXT:  Normal inspection. LOWER EXT:  No edema, no calf tenderness. Distal pulses intact. NEURO:  Moves all four extremities. Normal motor exam and sensation in all four extremities. Normal CN II-XII exam.  Limited bilateral finger-to-nose given her legal status of being legally blind. SKIN:  No rashes;  Normal for age. PSYCH:  Alert and normal affect.  
 
DIFFERENTIAL DIAGNOSES/ MEDICAL DECISION MAKING: 
 Hypoglycemia, acute alcohol, drug, or multipharmacy intoxication, sepsis from numerous possible sources including urosepsis, pneumonia, meningitis, significant CVA, TIA, intracerebral hemorrhage, subdural hemorrhage, seizure, significant trauma, electrolyte or hormonal imbalance, other etiologies, versus a combination of the above. Diagnostic Study Results Abnormal lab results from this emergency department encounter: 
Labs Reviewed METABOLIC PANEL, BASIC - Abnormal; Notable for the following components:  
    Result Value Glucose 319 (*) All other components within normal limits CBC WITH AUTOMATED DIFF  
DRUG SCREEN, URINE  
CARDIAC PANEL,(CK, CKMB & TROPONIN) Lab values for this patient within approximately the last 12 hours: 
Recent Results (from the past 12 hour(s)) METABOLIC PANEL, BASIC Collection Time: 05/06/19  4:15 PM  
Result Value Ref Range Sodium 141 136 - 145 mmol/L Potassium 4.0 3.5 - 5.5 mmol/L Chloride 102 100 - 108 mmol/L  
 CO2 32 21 - 32 mmol/L Anion gap 7 3.0 - 18 mmol/L Glucose 319 (H) 74 - 99 mg/dL BUN 12 7.0 - 18 MG/DL Creatinine 0.72 0.6 - 1.3 MG/DL  
 BUN/Creatinine ratio 17 12 - 20 GFR est AA >60 >60 ml/min/1.73m2 GFR est non-AA >60 >60 ml/min/1.73m2 Calcium 8.8 8.5 - 10.1 MG/DL  
CBC WITH AUTOMATED DIFF Collection Time: 05/06/19  4:15 PM  
Result Value Ref Range WBC 5.7 4.6 - 13.2 K/uL  
 RBC 4.46 4.20 - 5.30 M/uL  
 HGB 13.0 12.0 - 16.0 g/dL HCT 38.1 35.0 - 45.0 % MCV 85.4 74.0 - 97.0 FL  
 MCH 29.1 24.0 - 34.0 PG  
 MCHC 34.1 31.0 - 37.0 g/dL  
 RDW 12.2 11.6 - 14.5 % PLATELET 588 185 - 712 K/uL MPV 9.7 9.2 - 11.8 FL  
 NEUTROPHILS 65 40 - 73 % LYMPHOCYTES 26 21 - 52 % MONOCYTES 8 3 - 10 % EOSINOPHILS 1 0 - 5 % BASOPHILS 0 0 - 2 %  
 ABS. NEUTROPHILS 3.7 1.8 - 8.0 K/UL  
 ABS. LYMPHOCYTES 1.5 0.9 - 3.6 K/UL  
 ABS. MONOCYTES 0.4 0.05 - 1.2 K/UL  
 ABS. EOSINOPHILS 0.1 0.0 - 0.4 K/UL ABS. BASOPHILS 0.0 0.0 - 0.1 K/UL  
 DF AUTOMATED    
DRUG SCREEN, URINE Collection Time: 05/06/19  4:15 PM  
Result Value Ref Range BENZODIAZEPINES NEGATIVE  NEG    
 BARBITURATES NEGATIVE  NEG    
 THC (TH-CANNABINOL) NEGATIVE  NEG    
 OPIATES NEGATIVE  NEG    
 PCP(PHENCYCLIDINE) NEGATIVE  NEG    
 COCAINE NEGATIVE  NEG    
 AMPHETAMINES NEGATIVE  NEG METHADONE NEGATIVE  NEG HDSCOM (NOTE) CARDIAC PANEL,(CK, CKMB & TROPONIN) Collection Time: 05/06/19  4:15 PM  
Result Value Ref Range CK 56 26 - 192 U/L  
 CK - MB <1.0 <3.6 ng/ml CK-MB Index  0.0 - 4.0 % CALCULATION NOT PERFORMED WHEN RESULT IS BELOW LINEAR LIMIT Troponin-I, QT <0.02 0.0 - 0.045 NG/ML Radiologist and cardiologist interpretations if available at time of this note: 
Ct Head Wo Cont Result Date: 5/6/2019 Head CT without contrast HISTORY: Right facial numbness, COMPARISON: 12/1/2018 Technique: CT images of the head were obtained. All CT scans at this facility are performed using dose optimization technique as appropriate to the performed exam, to include automated exposure control, adjustment of the mA and/or kV according to patient's size (Including appropriate matching for site-specific examinations), or use of iterative reconstruction technique. FINDINGS: No intracranial hemorrhage, extra-axial fluid collection or mass effect. No shift of midline structures. No evidence for acute ischemia. Intact osseous structures. The visualized paranasal air sinuses are aerated. IMPRESSION: No CT evidence of acute intracranial abnormality. Please note MRI is more sensitive than CT in the first 24-48 hours in the detection of acute ischemia. Discussed with ordering physician at 1712 hour. CT head as read by radiologist:  Nothing acute. Neurology 13 radiology was Medication(s) ordered for patient during this emergency visit encounter: 
Medications - No data to display Medical Decision Making I am the first provider for this patient. I reviewed the vital signs, available nursing notes, past medical history, past surgical history, family history and social history. Vital Signs:  Reviewed the patient's vital signs. ED COURSE:  
 
Consult Teleneurology The on call neurologist was called and the patient was presented for neurology consult. I personally spoke with Dr. Maldonado Gilliam, in the teleneurology group, about the patient's presentation and management. Recommends from the teleneurologist are: 
Dr. Maldonado Gilliam does not believe this is a an acute neurological event. He was is more likely a syncopal event with a constellation associate with that and less likely a seizure. He was patient can be discharged home if laboratory work-up is normal for further outpatient work-up and give the patient seizure precautions. Coding Diagnoses Clinical Impression: 1. Near syncope 2. Transient speech disturbance 3. Numbness and tingling of right face Disposition Disposition: Home. Rosy Queen M.D. ADRY Board Certified Emergency Physician Provider Attestation: If a scribe was utilized in generation of this patient record, I personally performed the services described in the documentation, reviewed the documentation, as recorded by the scribe in my presence, and it accurately records the patient's history of presenting illness, review of systems, patient physical examination, and procedures performed by me as the attending physician. Rosy Queen M.D. ADRY Board Certified Emergency Physician 5/6/2019. 
5:10 PM

## 2019-05-06 NOTE — DISCHARGE INSTRUCTIONS
Specific instructions in the emergency physician who treated in the ER today:  1. Return to the ER if worse. 2.  Follow-up with your primary care doctor in the next few days for reevaluation. Patient Education        Lightheadedness or Faintness: Care Instructions  Your Care Instructions  Lightheadedness is a feeling that you are about to faint or \"pass out. \" You do not feel as if you or your surroundings are moving. It is different from vertigo, which is the feeling that you or things around you are spinning or tilting. Lightheadedness usually goes away or gets better when you lie down. If lightheadedness gets worse, it can lead to a fainting spell. It is common to feel lightheaded from time to time. Lightheadedness usually is not caused by a serious problem. It often is caused by a short-lasting drop in blood pressure and blood flow to your head that occurs when you get up too quickly from a seated or lying position. Follow-up care is a key part of your treatment and safety. Be sure to make and go to all appointments, and call your doctor if you are having problems. It's also a good idea to know your test results and keep a list of the medicines you take. How can you care for yourself at home? · Lie down for 1 or 2 minutes when you feel lightheaded. After lying down, sit up slowly and remain sitting for 1 to 2 minutes before slowly standing up. · Avoid movements, positions, or activities that have made you lightheaded in the past.  · Get plenty of rest, especially if you have a cold or flu, which can cause lightheadedness. · Make sure you drink plenty of fluids, especially if you have a fever or have been sweating. · Do not drive or put yourself and others in danger while you feel lightheaded. When should you call for help? Call 911 anytime you think you may need emergency care. For example, call if:    · You have symptoms of a stroke.  These may include:  ? Sudden numbness, tingling, weakness, or loss of movement in your face, arm, or leg, especially on only one side of your body. ? Sudden vision changes. ? Sudden trouble speaking. ? Sudden confusion or trouble understanding simple statements. ? Sudden problems with walking or balance. ? A sudden, severe headache that is different from past headaches.     · You have symptoms of a heart attack. These may include:  ? Chest pain or pressure, or a strange feeling in the chest.  ? Sweating. ? Shortness of breath. ? Nausea or vomiting. ? Pain, pressure, or a strange feeling in the back, neck, jaw, or upper belly or in one or both shoulders or arms. ? Lightheadedness or sudden weakness. ? A fast or irregular heartbeat. After you call 911, the  may tell you to chew 1 adult-strength or 2 to 4 low-dose aspirin. Wait for an ambulance. Do not try to drive yourself.    Watch closely for changes in your health, and be sure to contact your doctor if:    · Your lightheadedness gets worse or does not get better with home care. Where can you learn more? Go to http://rakel-lebron.info/. Enter M406 in the search box to learn more about \"Lightheadedness or Faintness: Care Instructions. \"  Current as of: September 23, 2018  Content Version: 11.9  © 5493-0394 [employer]. Care instructions adapted under license by [employer] (which disclaims liability or warranty for this information). If you have questions about a medical condition or this instruction, always ask your healthcare professional. [place] [address] any warranty or liability for your use of this information. Patient Education        Numbness and Tingling: Care Instructions  Your Care Instructions    Many things can cause numbness or tingling. Swelling may put pressure on a nerve. This could cause you to lose feeling or have a pins-and-needles sensation on part of your body.  Nerves may be damaged from trauma, toxins, or diseases, such as diabetes or multiple sclerosis (MS). Sometimes, though, the cause is not clear. If there is no clear reason for your symptoms, and you are not having any other symptoms, your doctor may suggest watching and waiting for a while to see if the numbness or tingling goes away on its own. Your doctor may want you to have blood or nerve tests to find the cause of your symptoms. Follow-up care is a key part of your treatment and safety. Be sure to make and go to all appointments, and call your doctor if you are having problems. It's also a good idea to know your test results and keep a list of the medicines you take. How can you care for yourself at home? · If your doctor prescribes medicine, take it exactly as directed. Call your doctor if you think you are having a problem with your medicine. · If you have any swelling, put ice or a cold pack on the area for 10 to 20 minutes at a time. Put a thin cloth between the ice and your skin. When should you call for help? Call 911 anytime you think you may need emergency care. For example, call if:    · You have weakness, numbness, or tingling in both legs.     · You lose bowel or bladder control.     · You have symptoms of a stroke. These may include:  ? Sudden numbness, tingling, weakness, or loss of movement in your face, arm, or leg, especially on only one side of your body. ? Sudden vision changes. ? Sudden trouble speaking. ? Sudden confusion or trouble understanding simple statements. ? Sudden problems with walking or balance. ? A sudden, severe headache that is different from past headaches.    Watch closely for changes in your health, and be sure to contact your doctor if you have any problems, or if:    · You do not get better as expected. Where can you learn more? Go to http://rakel-lebron.info/. Enter W202 in the search box to learn more about \"Numbness and Tingling: Care Instructions. \"  Current as of: Teagan 3, 2018  Content Version: 11.9  © 0628-8654 [employer]. Care instructions adapted under license by [employer] (which disclaims liability or warranty for this information). If you have questions about a medical condition or this instruction, always ask your healthcare professional. [address] any warranty or liability for your use of this information. [employer] Activation    Thank you for requesting access to [employer]. Please follow the instructions below to securely access and download your online medical record. [employer] allows you to send messages to your doctor, view your test results, renew your prescriptions, schedule appointments, and more. How Do I Sign Up? 1. In your internet browser, go to [email or web address] 2. Click on the First Time User? Click Here link in the Sign In box. You will see the New Member Sign Up page. 3. Enter your Grow[employer] Access Code exactly as it appears below. You will not need to use this code after youve completed the sign-up process. If you do not sign up before the expiration date, you must request a new code. [employer] Access Code: [email or web address]  Expires: 6/3/2019 11:38 AM (This is the date your [employer] access code will )    4. Enter the last four digits of your Social Security Number (xxxx) and Date of Birth (mm/dd/yyyy) as indicated and click Submit. You will be taken to the next sign-up page. 5. Create a Grow[employer] ID. This will be your [employer] login ID and cannot be changed, so think of one that is secure and easy to remember. 6. Create a Grow[employer] password. You can change your password at any time. 7. Enter your Password Reset Question and Answer. This can be used at a later time if you forget your password. 8. Enter your e-mail address. You will receive e-mail notification when new information is available in [address] 9. Click Sign Up.  You can now view and download portions of your medical record. 10. Click the Download Summary menu link to download a portable copy of your medical information. Additional Information    If you have questions, please visit the Frequently Asked Questions section of the [employer] website at [email or web address] [employer]. Moogi[employer]Salman Enterprisest/. Remember, [employer] is NOT to be used for urgent needs. For medical emergencies, dial 911.

## 2019-05-06 NOTE — ED TRIAGE NOTES
Per EMS. From [facility]. TIA. Numbness on right side of face. Prior deficits from stroke in 2007. Hx of TIA's since stroke. Hx dm (), blindness. Allergic to albuterol.

## 2019-10-07 ENCOUNTER — IMPORTED ENCOUNTER (OUTPATIENT)
Dept: URBAN - METROPOLITAN AREA CLINIC 1 | Facility: CLINIC | Age: 59
End: 2019-10-07

## 2019-10-07 PROBLEM — E11.3521: Noted: 2019-10-07

## 2019-10-07 PROBLEM — H25.813: Noted: 2019-10-07

## 2019-10-07 PROBLEM — E11.3512: Noted: 2019-10-07

## 2019-10-07 PROCEDURE — 92014 COMPRE OPH EXAM EST PT 1/>: CPT

## 2019-10-07 NOTE — PATIENT DISCUSSION
1.  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 *poor view secondary to dense PSC*  Followed by Dr. Jacey Hubbard (see attachments) and has requested to perform Phaco. Will plan to reevaluate after Phaco.2.  DM Type II (currently not on any medication) with Proliferative Diabetic Retinopathy with Macular Edema OS:  Discussed the pathophysiology of diabetes and its effect on the eye and risk of blindness. Stressed the importance of strong glucose control. Advised of importance of at least yearly dilated examinations but to contact us immediately for any problems or concerns. *poor view secondary to dense PSC* Followed by Dr. Jacey Hubbard. (See attachments)3. Cataract OU --  Visually Significant dense PSC OU discussed the risks benefits alternatives and limitations of cataract surgery. The patient stated a full understanding and a desire to proceed with the procedure. The patient will need to return for preop appointment with cataract measurements and to have any additional questions answered and start pre-operative eye drops as directed. Phaco PCL OD then OS * may need short trial of Tobrex to r/o allergy. Return for an appointment for Ascan/H and P. with Dr. Mitchell Brown.

## 2019-10-21 ENCOUNTER — IMPORTED ENCOUNTER (OUTPATIENT)
Dept: URBAN - METROPOLITAN AREA CLINIC 1 | Facility: CLINIC | Age: 59
End: 2019-10-21

## 2019-10-21 PROBLEM — H25.811: Noted: 2019-10-21

## 2019-10-21 PROCEDURE — 92136 OPHTHALMIC BIOMETRY: CPT

## 2019-10-21 NOTE — PATIENT DISCUSSION
1. Cataract OD:  Visually Significant dense PSC discussed the risks benefits alternatives and limitations of cataract surgery. The patient stated a full understanding and a desire to proceed with the procedure. Discussed with patient if PO Gtts are more than $120 for all three combined when filling at their Pharmacy please call our office to request generic substitutions.  Phaco PCL

## 2019-10-30 ENCOUNTER — IMPORTED ENCOUNTER (OUTPATIENT)
Dept: URBAN - METROPOLITAN AREA CLINIC 1 | Facility: CLINIC | Age: 59
End: 2019-10-30

## 2019-10-30 PROBLEM — H25.811 COMBINED FORM OF SENILE CATARACT OF RIGHT EYE: Status: RESOLVED | Noted: 2019-10-30 | Resolved: 2019-10-30

## 2019-10-30 PROBLEM — H25.811 COMBINED FORM OF SENILE CATARACT OF RIGHT EYE: Status: ACTIVE | Noted: 2019-10-30

## 2019-10-31 ENCOUNTER — IMPORTED ENCOUNTER (OUTPATIENT)
Dept: URBAN - METROPOLITAN AREA CLINIC 1 | Facility: CLINIC | Age: 59
End: 2019-10-31

## 2019-10-31 PROBLEM — Z96.1: Noted: 2019-10-31

## 2019-10-31 PROCEDURE — 99024 POSTOP FOLLOW-UP VISIT: CPT

## 2019-10-31 NOTE — PATIENT DISCUSSION
POD#1 CE/IOL OD (Standard) doing well. **Va limited by tractional RD/PDR OD Followed by Dr. Jacey Hubbard Use Prednisolone BID OD Tobramycin TID OD : Use all three gtts through completion of PO gtt chart regimen/ Per our instructions given to patient.   Post op Warnings Reiterated RTC as scheduled

## 2019-11-07 ENCOUNTER — IMPORTED ENCOUNTER (OUTPATIENT)
Dept: URBAN - METROPOLITAN AREA CLINIC 1 | Facility: CLINIC | Age: 59
End: 2019-11-07

## 2019-11-07 PROBLEM — H25.812: Noted: 2019-11-07

## 2019-11-07 PROCEDURE — 92136 OPHTHALMIC BIOMETRY: CPT

## 2019-11-07 NOTE — PATIENT DISCUSSION
1.  Cataract OS Visually Significant discussed the risks benefits alternatives and limitations of cataract surgery. The patient stated a full understanding and a desire to proceed with the procedure. Discussed with patient if PO Gtts are more than $120 for all three combined when filling at their Pharmacy please call our office to request generic substitutions. Pt understands they will need glasses post-op to achieve their best corrected vision. Phaco PCL OS 2. POW#3  CE/IOL OD (Standard) doing well. **Va limited by tractional RD/PDR OD Followed by Dr. Elle Pickens Use Prednisolone BID OD per schedule ok to d/c Tobramycin OD : Use gtts through completion of PO gtt chart regimen.  F/u as scheduled 2nd eye

## 2019-11-09 ENCOUNTER — IMPORTED ENCOUNTER (OUTPATIENT)
Dept: URBAN - METROPOLITAN AREA CLINIC 1 | Facility: CLINIC | Age: 59
End: 2019-11-09

## 2019-11-09 PROBLEM — H53.149: Noted: 2019-11-09

## 2019-11-09 PROCEDURE — 92012 INTRM OPH EXAM EST PATIENT: CPT

## 2019-11-09 NOTE — PATIENT DISCUSSION
1.  Photophobia -- Likely secondary to the recent removal of dense PSC OD (Cat sx done on 10/30). Patient previously had very dense PSC and states has not had any vision/light perception for 5 years in that eye. Recommend patient to wear Cataract sunglasses full time indoors and outdoors until patient adjusts to the change. 2.  Headache -- May be in result of new onset of photophobia however recommend patient to f/u with PCP if persists.  Recommend f/u as scheduled with Dr. Maciel Hurt

## 2019-11-13 ENCOUNTER — IMPORTED ENCOUNTER (OUTPATIENT)
Dept: URBAN - METROPOLITAN AREA CLINIC 1 | Facility: CLINIC | Age: 59
End: 2019-11-13

## 2019-11-13 PROBLEM — H25.812 COMBINED FORM OF SENILE CATARACT OF LEFT EYE: Status: ACTIVE | Noted: 2019-11-13

## 2019-11-14 ENCOUNTER — IMPORTED ENCOUNTER (OUTPATIENT)
Dept: URBAN - METROPOLITAN AREA CLINIC 1 | Facility: CLINIC | Age: 59
End: 2019-11-14

## 2019-11-14 PROBLEM — Z96.1: Noted: 2019-11-14

## 2019-11-14 PROCEDURE — 99024 POSTOP FOLLOW-UP VISIT: CPT

## 2019-11-14 NOTE — PATIENT DISCUSSION
1. POD#1 Phaco/ PCL OD (Standard) - doing well. Use Prednisolone TID OD x 5 days then decrease to BID OD as per gtt instruction chart Tobraymcin TID OD : Use all three gtts through completion of PO gtt chart regimen/ Per our instructions given. Post op Warnings Reiterated 2.  POW#3 Phaco/ PCL OS (Standard) - doing well Use Prednisolone BID OD per scheduleRTC as scheduled

## 2019-11-30 PROBLEM — R74.8 ELEVATED LIPASE: Status: ACTIVE | Noted: 2019-11-30

## 2019-11-30 PROBLEM — R73.9 HYPERGLYCEMIA: Status: ACTIVE | Noted: 2019-11-30

## 2019-11-30 PROBLEM — R07.9 CHEST PAIN: Status: ACTIVE | Noted: 2019-11-30

## 2019-11-30 PROBLEM — R10.13 EPIGASTRIC PAIN: Status: ACTIVE | Noted: 2019-11-30

## 2019-12-09 ENCOUNTER — IMPORTED ENCOUNTER (OUTPATIENT)
Dept: URBAN - METROPOLITAN AREA CLINIC 1 | Facility: CLINIC | Age: 59
End: 2019-12-09

## 2019-12-09 PROBLEM — Z09: Noted: 2019-12-09

## 2019-12-09 PROCEDURE — 99024 POSTOP FOLLOW-UP VISIT: CPT

## 2019-12-09 NOTE — PATIENT DISCUSSION
POW#3 Phaco/ PCL OU. Standard OU. Good Result. Va limited by tractional RD OD / PDR OU - Followed by Dr. Mota Adjutant. Will refer [facility] for second opinion. Finish PO meds per PO gtt schedule MRX for glasses givenReturn for an appointment in 6 months for a 10/dfe/Mrx with Dr. Jessica Kauffman.

## 2020-01-27 PROBLEM — E11.65 POORLY CONTROLLED DIABETES MELLITUS (HCC): Status: ACTIVE | Noted: 2020-01-27

## 2020-03-02 PROBLEM — I63.9 ACUTE CVA (CEREBROVASCULAR ACCIDENT) (HCC): Status: ACTIVE | Noted: 2020-03-02

## 2020-03-03 PROBLEM — R51.9 HEADACHE: Status: ACTIVE | Noted: 2020-03-03

## 2020-06-15 ENCOUNTER — IMPORTED ENCOUNTER (OUTPATIENT)
Dept: URBAN - METROPOLITAN AREA CLINIC 1 | Facility: CLINIC | Age: 60
End: 2020-06-15

## 2020-06-15 PROBLEM — E11.3523: Noted: 2020-06-15

## 2020-06-15 PROBLEM — H04.123: Noted: 2020-06-15

## 2020-06-15 PROBLEM — Z79.4: Noted: 2020-06-15

## 2020-06-15 PROBLEM — H16.143: Noted: 2020-06-15

## 2020-06-15 PROBLEM — Z96.1: Noted: 2020-06-15

## 2020-06-15 PROBLEM — H26.493: Noted: 2020-06-15

## 2020-06-15 PROCEDURE — 92012 INTRM OPH EXAM EST PATIENT: CPT

## 2020-06-15 NOTE — PATIENT DISCUSSION
1.  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 Will refer patient back to [facility] for Tractional RD repair OU. 2.  JAYDE w/ PEK OU- Recommend ATs TID OU routinely 3. PCO OU: (Posterior Capsule Opacification)   Observe and consider yag cap when pt feels pco visually significant and visual acuity decreases to appropriate level. 4. Pseudophakia OU - (Standard OU)Return for an appointment in 6 months 30/MRX with Dr. Magdalena Hinton.

## 2020-12-07 ENCOUNTER — IMPORTED ENCOUNTER (OUTPATIENT)
Dept: URBAN - METROPOLITAN AREA CLINIC 1 | Facility: CLINIC | Age: 60
End: 2020-12-07

## 2020-12-07 PROBLEM — H26.493: Noted: 2020-12-07

## 2020-12-07 PROBLEM — Z79.4: Noted: 2020-12-07

## 2020-12-07 PROBLEM — H16.143: Noted: 2020-12-07

## 2020-12-07 PROBLEM — E11.3523: Noted: 2020-12-07

## 2020-12-07 PROBLEM — H04.123: Noted: 2020-12-07

## 2020-12-07 PROBLEM — Z96.1: Noted: 2020-12-07

## 2020-12-07 PROCEDURE — 92014 COMPRE OPH EXAM EST PT 1/>: CPT

## 2020-12-07 NOTE — PATIENT DISCUSSION
1.  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 Followed by [facility] 2. JAYDE w/ PEK OU- Recommend ATs TID OU routinely 3. PCO OU: (Posterior Capsule Opacification)   Observe and consider yag cap when pt feels pco visually significant and visual acuity decreases to appropriate level. 4. Pseudophakia OU - (Standard OU)Return for an appointment in 1 year 27 with Dr. Juan Bliss.

## 2021-06-24 PROBLEM — M48.02 CERVICAL SPINAL STENOSIS: Status: ACTIVE | Noted: 2021-06-24

## 2021-08-03 PROBLEM — R42 DIZZINESS: Status: RESOLVED | Noted: 2017-07-10 | Resolved: 2021-08-03

## 2021-12-13 ENCOUNTER — IMPORTED ENCOUNTER (OUTPATIENT)
Dept: URBAN - METROPOLITAN AREA CLINIC 1 | Facility: CLINIC | Age: 61
End: 2021-12-13

## 2021-12-13 PROBLEM — Z96.1: Noted: 2021-12-13

## 2021-12-13 PROBLEM — H16.143: Noted: 2021-12-13

## 2021-12-13 PROBLEM — H26.493: Noted: 2021-12-13

## 2021-12-13 PROBLEM — E11.3553: Noted: 2021-12-13

## 2021-12-13 PROBLEM — H04.123: Noted: 2021-12-13

## 2021-12-13 PROCEDURE — 99214 OFFICE O/P EST MOD 30 MIN: CPT

## 2021-12-13 PROCEDURE — 92015 DETERMINE REFRACTIVE STATE: CPT

## 2021-12-13 NOTE — PATIENT DISCUSSION
1.  DM Type II (Insulin) with Proliferative Diabetic Retinopathy stable OU. H/o Tractional RD repair. Followed by [employer] Q3 months. 2.  JAYDE w/ PEK OU -- The use/continuation of artificial tears were recommended. 3.  PCO OU -- Visually Significant *secondary to glare*; schedule YAG Cap. Pros cons risks and benefits. 4.  Pseudophakia OU -- Standard OU. Return for an appointment in YAG Cap OD then OS with Dr. Alexandra Milner.

## 2022-01-07 ENCOUNTER — IMPORTED ENCOUNTER (OUTPATIENT)
Dept: URBAN - METROPOLITAN AREA CLINIC 1 | Facility: CLINIC | Age: 62
End: 2022-01-07

## 2022-01-07 PROBLEM — H26.491: Noted: 2022-01-07

## 2022-01-07 PROCEDURE — 66821 AFTER CATARACT LASER SURGERY: CPT

## 2022-01-07 NOTE — PATIENT DISCUSSION
YAG CAP OD: (Consent signed and scanned into attachments) 1 gtt Prolensa applied. The purpose and nature of the procedure possible alternative methods of treatment the risks involved and the possibility of complications were discussed with patient. The Patient wishes to proceed and the consent was signed. The laser was then performed under topical anesthesia with no complications. Post op instructions were given to patient as well as a follow-up appointment. Patient was advised to call our office if any questions or concerns. Return for an appointment as scheduled with Dr. Mitchell Brown.

## 2022-02-02 ENCOUNTER — IMPORTED ENCOUNTER (OUTPATIENT)
Dept: URBAN - METROPOLITAN AREA CLINIC 1 | Facility: CLINIC | Age: 62
End: 2022-02-02

## 2022-02-02 PROBLEM — H00.15: Noted: 2022-02-02

## 2022-02-02 PROCEDURE — 92012 INTRM OPH EXAM EST PATIENT: CPT

## 2022-02-02 NOTE — PATIENT DISCUSSION
1.  Chalazion left lower eyelid -- Begin Hot compresses TID x 5 minutes for 3 weeks. If without improvement discussed with patient possible Incision and Drainage procedure. Risks and benefits discussed with patient and patient states full understanding. 2. PCO OS -- follow up as scheduled for YAG Cap OS. 3.  JAYDE w/ PEK OU -- The use/continuation of artificial tears were recommended. 4.  Pseudophakia OU -- Standard OU. H/lo YAG Cap OD H/o DM Type II (Insulin) with Proliferative Diabetic Retinopathy stable OU. H/o Tractional RD repair. Followed by [facility] months. Return for an appointment in As scheduled Friday for YAG Cap OS with Dr. Rashmi Higgins.

## 2022-02-04 ENCOUNTER — IMPORTED ENCOUNTER (OUTPATIENT)
Dept: URBAN - METROPOLITAN AREA CLINIC 1 | Facility: CLINIC | Age: 62
End: 2022-02-04

## 2022-02-04 PROBLEM — H26.492: Noted: 2022-02-04

## 2022-02-04 PROCEDURE — 66821 AFTER CATARACT LASER SURGERY: CPT

## 2022-02-04 NOTE — PATIENT DISCUSSION
YAG CAP OS: (Consent signed and scanned into attachments) 1 gtt Prolensa applied. The purpose and nature of the procedure possible alternative methods of treatment the risks involved and the possibility of complications were discussed with patient. The Patient wishes to proceed and the consent was signed. The laser was then performed under topical anesthesia with no complications. Post op instructions were given to patient as well as a follow-up appointment. Patient was advised to call our office if any questions or concerns. Return for an appointment in 1-4 weeks po/YAG with Dr. Nery Sparks.

## 2022-02-16 PROBLEM — R55 SYNCOPE AND COLLAPSE: Status: ACTIVE | Noted: 2022-02-16

## 2022-03-18 PROBLEM — R73.9 HYPERGLYCEMIA: Status: ACTIVE | Noted: 2019-11-30

## 2022-03-18 PROBLEM — R74.8 ELEVATED LIPASE: Status: ACTIVE | Noted: 2019-11-30

## 2022-03-18 PROBLEM — M48.02 CERVICAL SPINAL STENOSIS: Status: ACTIVE | Noted: 2021-06-24

## 2022-03-19 PROBLEM — H25.812 COMBINED FORM OF SENILE CATARACT OF LEFT EYE: Status: ACTIVE | Noted: 2019-11-13

## 2022-03-19 PROBLEM — R51.9 HEADACHE: Status: ACTIVE | Noted: 2020-03-03

## 2022-03-19 PROBLEM — R53.1 LEFT-SIDED WEAKNESS: Status: ACTIVE | Noted: 2018-05-27

## 2022-03-19 PROBLEM — I63.9 ACUTE CVA (CEREBROVASCULAR ACCIDENT) (HCC): Status: ACTIVE | Noted: 2020-03-02

## 2022-03-19 PROBLEM — G45.9 TIA (TRANSIENT ISCHEMIC ATTACK): Status: ACTIVE | Noted: 2017-07-10

## 2022-03-19 PROBLEM — R55 SYNCOPE AND COLLAPSE: Status: ACTIVE | Noted: 2022-02-16

## 2022-03-19 PROBLEM — R55 SYNCOPE: Status: ACTIVE | Noted: 2017-02-21

## 2022-03-19 PROBLEM — R07.9 CHEST PAIN: Status: ACTIVE | Noted: 2019-11-30

## 2022-03-20 PROBLEM — E11.65 POORLY CONTROLLED DIABETES MELLITUS (HCC): Status: ACTIVE | Noted: 2020-01-27

## 2022-03-20 PROBLEM — R10.13 EPIGASTRIC PAIN: Status: ACTIVE | Noted: 2019-11-30

## 2022-04-02 ASSESSMENT — TONOMETRY
OS_IOP_MMHG: 16
OD_IOP_MMHG: 14
OD_IOP_MMHG: 13
OD_IOP_MMHG: 15
OD_IOP_MMHG: 14
OD_IOP_MMHG: 17
OS_IOP_MMHG: 15
OD_IOP_MMHG: 16
OD_IOP_MMHG: 13
OS_IOP_MMHG: 13
OS_IOP_MMHG: 14
OS_IOP_MMHG: 14
OD_IOP_MMHG: 14
OS_IOP_MMHG: 14
OD_IOP_MMHG: 14
OS_IOP_MMHG: 16
OS_IOP_MMHG: 14
OD_IOP_MMHG: 15
OS_IOP_MMHG: 13
OS_IOP_MMHG: 17

## 2022-04-02 ASSESSMENT — VISUAL ACUITY
OD_CC: CF@6''
OD_CC: CF@1'
OS_CC: 20/40-2
OD_CC: CF@1'
OS_CC: CF@1'
OS_CC: CF@2'
OD_CC: CF@1'
OD_CC: 20/400
OS_CC: CF@3'
OD_CC: CF@1'
OD_CC: CF@1'
OS_CC: 20/400
OD_CC: CF@1'
OS_CC: CF@1'
OD_CC: CF@1'
OD_CC: CF@2'
OS_CC: CF@1'

## 2022-04-04 ENCOUNTER — POST-OP (OUTPATIENT)
Dept: URBAN - METROPOLITAN AREA CLINIC 1 | Facility: CLINIC | Age: 62
End: 2022-04-04

## 2022-04-04 DIAGNOSIS — Z98.890: ICD-10-CM

## 2022-04-04 PROCEDURE — 99024 POSTOP FOLLOW-UP VISIT: CPT

## 2022-04-04 ASSESSMENT — TONOMETRY
OD_IOP_MMHG: 13
OS_IOP_MMHG: 13

## 2022-04-04 ASSESSMENT — VISUAL ACUITY
OS_SC: 20/200
OS_SC: 20/200

## 2022-05-05 ENCOUNTER — EMERGENCY VISIT (OUTPATIENT)
Dept: URBAN - METROPOLITAN AREA CLINIC 1 | Facility: CLINIC | Age: 62
End: 2022-05-05

## 2022-05-05 DIAGNOSIS — H04.123: ICD-10-CM

## 2022-05-05 DIAGNOSIS — H10.45: ICD-10-CM

## 2022-05-05 DIAGNOSIS — H16.143: ICD-10-CM

## 2022-05-05 PROCEDURE — 99213 OFFICE O/P EST LOW 20 MIN: CPT

## 2022-05-05 ASSESSMENT — TONOMETRY
OS_IOP_MMHG: 13
OD_IOP_MMHG: 13

## 2022-05-05 ASSESSMENT — VISUAL ACUITY
OS_CC: 20/150
OS_SC: 20/200
OS_SC: 20/200

## 2022-05-05 NOTE — PATIENT DISCUSSION
(Allergic) Recommend OTC Pataday QD OU, PRN itching. Condition discussed with patient. Avoidance of allergens recommended (Handout given).

## 2022-05-05 NOTE — PATIENT DISCUSSION
Patient stated issues with NVA, advised patient that due to her [address] the MRx that we gave is the best VA possible. However, it may be beneficial for her to download apps on her phone with enhanced/bigger font. Referred to Dr. Gladis Pedraza for low vision therapy.

## 2022-11-03 ENCOUNTER — ANESTHESIA EVENT (OUTPATIENT)
Dept: ENDOSCOPY | Age: 62
End: 2022-11-03
Payer: MEDICARE

## 2022-11-03 NOTE — PERIOP NOTES
PRE-SURGICAL INSTRUCTIONS        Patient's Name:  Pedro PAZ Date:  11/3/2022            Covid Testing Date and Time:  11/1/22 at [facility]    Surgery Date:  11/4/2022                Do NOT eat or drink anything, including candy, gum, or ice chips after midnight on 11/4/22, unless you have specific instructions from your surgeon or anesthesia provider to do so. You may brush your teeth before coming to the hospital.  No smoking 24 hours prior to the day of surgery. No alcohol 24 hours prior to the day of surgery. No recreational drugs for one week prior to the day of surgery. Leave all valuables, including money/purse, at home. Remove all jewelry, nail polish, acrylic nails, and makeup (including mascara); no lotions powders, deodorant, or perfume/cologne/after shave on the skin. Follow instruction for Hibiclens washes and CHG wipes from surgeon's office. Glasses/contact lenses and dentures may be worn to the hospital.  They will be removed prior to surgery. Call your doctor if symptoms of a cold or illness develop within 24-48 hours prior to your surgery. 11.  If you are having an outpatient procedure, please make arrangements for a responsible ADULT TO [address] [place] [address] and stay with you for 24 hours after your surgery. 12. ONE VISITOR in the hospital at this time for outpatient procedures. Exceptions may be made for surgical admissions, per nursing unit guidelines      Special Instructions:      Bring list of CURRENT medications. Bring any pertinent legal medical records. Take these medications the morning of surgery with a sip of water:  none  Follow physician instructions about insulin - patient to call Dr. Jenelle Cotrtell for instructins concerning insulin    Complete bowel prep per MD instructions. On the day of surgery, come in the main entrance of [facility]. Let the  at the desk know you are there for surgery.   A staff member will come escort you to the surgical area on the second floor. If you have any questions or concerns, please do not hesitate to call:     (Prior to the day of surgery) PAT department:  [phone]   (Day of surgery) Pre-Op department:  [phone]    These surgical instructions were reviewed with Jennie Singer during the [facility] phone call.

## 2022-11-04 ENCOUNTER — ANESTHESIA (OUTPATIENT)
Dept: ENDOSCOPY | Age: 62
End: 2022-11-04
Payer: MEDICARE

## 2023-01-16 ENCOUNTER — HOSPITAL ENCOUNTER (OUTPATIENT)
Age: 63
Setting detail: OUTPATIENT SURGERY
Discharge: HOME | End: 2023-01-16
Attending: INTERNAL MEDICINE | Admitting: INTERNAL MEDICINE
Payer: MEDICARE

## 2023-01-16 VITALS
RESPIRATION RATE: 11 BRPM | WEIGHT: 143 LBS | DIASTOLIC BLOOD PRESSURE: 61 MMHG | SYSTOLIC BLOOD PRESSURE: 126 MMHG | OXYGEN SATURATION: 100 % | BODY MASS INDEX: 24.41 KG/M2 | HEIGHT: 64 IN | TEMPERATURE: 97.8 F | HEART RATE: 75 BPM

## 2023-01-16 LAB
GLUCOSE BLD STRIP.AUTO-MCNC: 243 MG/DL (ref 70–110)
GLUCOSE BLD STRIP.AUTO-MCNC: 248 MG/DL (ref 70–110)

## 2023-01-16 PROCEDURE — 00811 ANES LWR INTST NDSC NOS: CPT | Performed by: ANESTHESIOLOGY

## 2023-01-16 PROCEDURE — 77030013992 HC SNR POLYP ENDOSC BSC -B: Performed by: INTERNAL MEDICINE

## 2023-01-16 PROCEDURE — 00811 ANES LWR INTST NDSC NOS: CPT | Performed by: NURSE ANESTHETIST, CERTIFIED REGISTERED

## 2023-01-16 PROCEDURE — 2709999900 HC NON-CHARGEABLE SUPPLY: Performed by: INTERNAL MEDICINE

## 2023-01-16 PROCEDURE — 88305 TISSUE EXAM BY PATHOLOGIST: CPT

## 2023-01-16 PROCEDURE — 74011250636 HC RX REV CODE- 250/636: Performed by: NURSE ANESTHETIST, CERTIFIED REGISTERED

## 2023-01-16 PROCEDURE — 77030008565 HC TBNG SUC IRR ERBE -B: Performed by: INTERNAL MEDICINE

## 2023-01-16 PROCEDURE — 74011000250 HC RX REV CODE- 250: Performed by: NURSE ANESTHETIST, CERTIFIED REGISTERED

## 2023-01-16 PROCEDURE — 76060000031 HC ANESTHESIA FIRST 0.5 HR: Performed by: INTERNAL MEDICINE

## 2023-01-16 PROCEDURE — 82962 GLUCOSE BLOOD TEST: CPT

## 2023-01-16 PROCEDURE — 76040000019: Performed by: INTERNAL MEDICINE

## 2023-01-16 RX ORDER — INSULIN LISPRO 100 [IU]/ML
INJECTION, SOLUTION INTRAVENOUS; SUBCUTANEOUS ONCE
Status: DISCONTINUED | OUTPATIENT
Start: 2023-01-16 | End: 2023-01-16 | Stop reason: HOSPADM

## 2023-01-16 RX ORDER — ONDANSETRON 2 MG/ML
4 INJECTION INTRAMUSCULAR; INTRAVENOUS ONCE
Status: DISCONTINUED | OUTPATIENT
Start: 2023-01-16 | End: 2023-01-16 | Stop reason: HOSPADM

## 2023-01-16 RX ORDER — SODIUM CHLORIDE 0.9 % (FLUSH) 0.9 %
5-40 SYRINGE (ML) INJECTION EVERY 8 HOURS
Status: DISCONTINUED | OUTPATIENT
Start: 2023-01-16 | End: 2023-01-16 | Stop reason: HOSPADM

## 2023-01-16 RX ORDER — SODIUM CHLORIDE, SODIUM LACTATE, POTASSIUM CHLORIDE, CALCIUM CHLORIDE 600; 310; 30; 20 MG/100ML; MG/100ML; MG/100ML; MG/100ML
25 INJECTION, SOLUTION INTRAVENOUS CONTINUOUS
Status: DISCONTINUED | OUTPATIENT
Start: 2023-01-16 | End: 2023-01-16 | Stop reason: HOSPADM

## 2023-01-16 RX ORDER — DEXTROSE MONOHYDRATE 100 MG/ML
0-250 INJECTION, SOLUTION INTRAVENOUS AS NEEDED
Status: DISCONTINUED | OUTPATIENT
Start: 2023-01-16 | End: 2023-01-16 | Stop reason: HOSPADM

## 2023-01-16 RX ORDER — LIDOCAINE HYDROCHLORIDE 20 MG/ML
INJECTION, SOLUTION EPIDURAL; INFILTRATION; INTRACAUDAL; PERINEURAL AS NEEDED
Status: DISCONTINUED | OUTPATIENT
Start: 2023-01-16 | End: 2023-01-16 | Stop reason: HOSPADM

## 2023-01-16 RX ORDER — SODIUM CHLORIDE, SODIUM LACTATE, POTASSIUM CHLORIDE, CALCIUM CHLORIDE 600; 310; 30; 20 MG/100ML; MG/100ML; MG/100ML; MG/100ML
75 INJECTION, SOLUTION INTRAVENOUS CONTINUOUS
Status: DISCONTINUED | OUTPATIENT
Start: 2023-01-16 | End: 2023-01-16 | Stop reason: HOSPADM

## 2023-01-16 RX ORDER — PROPOFOL 10 MG/ML
INJECTION, EMULSION INTRAVENOUS AS NEEDED
Status: DISCONTINUED | OUTPATIENT
Start: 2023-01-16 | End: 2023-01-16 | Stop reason: HOSPADM

## 2023-01-16 RX ORDER — IBUPROFEN 200 MG
4 TABLET ORAL AS NEEDED
Status: DISCONTINUED | OUTPATIENT
Start: 2023-01-16 | End: 2023-01-16 | Stop reason: HOSPADM

## 2023-01-16 RX ORDER — SODIUM CHLORIDE 0.9 % (FLUSH) 0.9 %
5-40 SYRINGE (ML) INJECTION AS NEEDED
Status: DISCONTINUED | OUTPATIENT
Start: 2023-01-16 | End: 2023-01-16 | Stop reason: HOSPADM

## 2023-01-16 RX ADMIN — PROPOFOL 50 MG: 10 INJECTION, EMULSION INTRAVENOUS at 07:50

## 2023-01-16 RX ADMIN — LIDOCAINE HYDROCHLORIDE 50 MG: 20 INJECTION, SOLUTION EPIDURAL; INFILTRATION; INTRACAUDAL; PERINEURAL at 07:48

## 2023-01-16 RX ADMIN — PROPOFOL 50 MG: 10 INJECTION, EMULSION INTRAVENOUS at 07:53

## 2023-01-16 RX ADMIN — PROPOFOL 20 MG: 10 INJECTION, EMULSION INTRAVENOUS at 07:58

## 2023-01-16 RX ADMIN — SODIUM CHLORIDE, POTASSIUM CHLORIDE, SODIUM LACTATE AND CALCIUM CHLORIDE 75 ML/HR: 600; 310; 30; 20 INJECTION, SOLUTION INTRAVENOUS at 07:17

## 2023-01-16 RX ADMIN — PROPOFOL 30 MG: 10 INJECTION, EMULSION INTRAVENOUS at 07:56

## 2023-01-16 RX ADMIN — PROPOFOL 100 MG: 10 INJECTION, EMULSION INTRAVENOUS at 07:48

## 2023-01-16 RX ADMIN — PROPOFOL 50 MG: 10 INJECTION, EMULSION INTRAVENOUS at 07:55

## 2023-01-16 RX ADMIN — PROPOFOL 50 MG: 10 INJECTION, EMULSION INTRAVENOUS at 08:00

## 2023-01-16 NOTE — ANESTHESIA POSTPROCEDURE EVALUATION
Procedure(s):  COLONOSCOPY with Polypectomy. No value filed.     Anesthesia Post Evaluation      Multimodal analgesia: multimodal analgesia used between 6 hours prior to anesthesia start to PACU discharge  Patient location during evaluation: bedside  Patient participation: complete - patient participated  Level of consciousness: awake  Pain management: adequate  Airway patency: patent  Anesthetic complications: no  Cardiovascular status: stable  Respiratory status: acceptable  Hydration status: acceptable  Post anesthesia nausea and vomiting:  controlled      INITIAL Post-op Vital signs:   Vitals Value Taken Time   /61 01/16/23 0832   Temp 36.6 °C (97.8 °F) 01/16/23 0812   Pulse 75 01/16/23 0832   Resp 11 01/16/23 0832   SpO2 100 % 01/16/23 0832

## 2023-01-16 NOTE — DISCHARGE INSTRUCTIONS
Colon Polyps: Care Instructions  Your Care Instructions     Colon polyps are growths in the colon or the rectum. The cause of most colon polyps is not known, and most people who get them do not have any problems. But a certain kind can turn into cancer. For this reason, regular testing for colon polyps is important for people as they get older. It is also important for anyone who has an increased risk for colon cancer. Polyps are usually found through routine colon cancer screening tests. Although most colon polyps are not cancerous, they are usually removed and then tested for cancer. Screening for colon cancer saves lives because the cancer can usually be cured if it is caught early. If you have a polyp that is the type that can turn into cancer, you may need more tests to examine your entire colon. The doctor will remove any other polyps that are found, and you will be tested more often. Follow-up care is a key part of your treatment and safety. Be sure to make and go to all appointments, and call your doctor if you are having problems. It's also a good idea to know your test results and keep a list of the medicines you take. How can you care for yourself at home? Regular exams to look for colon polyps are the best way to prevent polyps from turning into colon cancer. These can include stool tests, sigmoidoscopy, colonoscopy, and CT colonography. Talk with your doctor about a testing schedule that is right for you. To prevent polyps  There is no home treatment that can prevent colon polyps. But these steps may help lower your risk for cancer. Stay active. Being active can help you get to and stay at a healthy weight. Try to exercise on most days of the week. Walking is a good choice. Eat well. Choose a variety of vegetables, fruits, legumes (such as peas and beans), fish, poultry, and whole grains. Do not smoke.  If you need help quitting, talk to your doctor about stop-smoking programs and medicines. These can increase your chances of quitting for good. If you drink alcohol, limit how much you drink. Limit alcohol to 2 drinks a day for men and 1 drink a day for women. When should you call for help? Call your doctor now or seek immediate medical care if:    You have severe belly pain. Your stools are maroon or very bloody. Watch closely for changes in your health, and be sure to contact your doctor if:    You have a fever. You have nausea or vomiting. You have a change in bowel habits (new constipation or diarrhea). Your symptoms get worse or are not improving as expected. Where can you learn more? Go to http://www.lopez.com/  Enter C571 in the search box to learn more about \"Colon Polyps: Care Instructions. \"  Current as of: June 6, 2022               Content Version: 13.4  © 2006-2022 [employer]. Care instructions adapted under license by [employer] (which disclaims liability or warranty for this information). If you have questions about a medical condition or this instruction, always ask your healthcare professional. [place] [address] any warranty or liability for your use of this information. Colonoscopy: What to Expect at [address] [place] [address]  After a colonoscopy, you'll stay at the clinic until you wake up. Then you can go home. But you'll need to arrange for a ride. Your doctor will tell you when you can eat and do your other usual activities. Your doctor will talk to you about when you'll need your next colonoscopy. Your doctor can help you decide how often you need to be checked. This will depend on the results of your test and your risk for colorectal cancer. After the test, you may be bloated or have gas pains. You may need to pass gas. If a biopsy was done or a polyp was removed, you may have streaks of blood in your stool (feces) for a few days.  Problems such as heavy rectal bleeding may not occur until several weeks after the test. This isn't common. But it can happen after polyps are removed. This care sheet gives you a general idea about how long it will take for you to recover. But each person recovers at a different pace. Follow the steps below to get better as quickly as possible. How can you care for yourself at home? Activity    Rest when you feel tired. You can do your normal activities when it feels okay to do so. Diet    Follow your doctor's directions for eating. Unless your doctor has told you not to, drink plenty of fluids. This helps to replace the fluids that were lost during the colon prep. Do not drink alcohol. Medicines    Your doctor will tell you if and when you can restart your medicines. You will also be given instructions about taking any new medicines. If you take aspirin or some other blood thinner, ask your doctor if and when to start taking it again. Make sure that you understand exactly what your doctor wants you to do. If polyps were removed or a biopsy was done during the test, your doctor may tell you not to take aspirin or other anti-inflammatory medicines for a few days. These include ibuprofen (Advil, Motrin) and naproxen (Aleve). Other instructions    For your safety, do not drive or operate machinery until the medicine wears off and you can think clearly. Your doctor may tell you not to drive or operate machinery until the day after your test.     Do not sign legal documents or make major decisions until the medicine wears off and you can think clearly. The anesthesia can make it hard for you to fully understand what you are agreeing to. Follow-up care is a key part of your treatment and safety. Be sure to make and go to all appointments, and call your doctor if you are having problems. It's also a good idea to know your test results and keep a list of the medicines you take. When should you call for help?    Call 911 anytime you think you may need emergency care. For example, call if:    You passed out (lost consciousness). You pass maroon or bloody stools. You have trouble breathing. Call your doctor now or seek immediate medical care if:    You have pain that does not get better after you take pain medicine. You are sick to your stomach or cannot drink fluids. You have new or worse belly pain. You have blood in your stools. You have a fever. You cannot pass stools or gas. Watch closely for changes in your health, and be sure to contact your doctor if you have any problems. Where can you learn more? Go to http://www.gray.com/  Enter E264 in the search box to learn more about \"Colonoscopy: What to Expect at Home. \"  Current as of: September 8, 2021               Content Version: 13.2  © 2006-2022 [employer]. Care instructions adapted under license by [employer] (which disclaims liability or warranty for this information). If you have questions about a medical condition or this instruction, always ask your healthcare professional. [place] [address] any warranty or liability for your use of this information. DISCHARGE SUMMARY from Nurse     POST-PROCEDURE INSTRUCTIONS:    Call your Physician if you:  Observe any excess bleeding. Develop a temperature over 100.5o F. Experience abdominal, shoulder or chest pain. Notice any signs of decreased circulation or nerve impairment to an extremity such as a change in color, persistent numbness, tingling, coldness or increase in pain. Vomit blood or you have nausea and vomiting lasting longer than 4 hours. Are unable to take medications. Are unable to urinate within 8 hours after discharge following general anesthesia or intravenous sedation.     For the next 24 hours after receiving general anesthesia or intravenous sedation, or while taking prescription Narcotics, limit your activities:  Do NOT drive a motor vehicle, operate hazard machinery or power tools, or perform tasks that require coordination. The medication you received during your procedure may have some effect on your mental awareness. Do NOT make important personal or business decisions. The medication you received during your procedure may have some effect on your mental awareness. Do NOT drink alcoholic beverages. These drinks do not mix well with the medications that have been given to you. Upon discharge from the hospital, you must be accompanied by a responsible adult. Resume your diet as directed by your physician. Resume medications as your physician has prescribed. Please give a list of your current medications to your Primary Care Provider. Please update this list whenever your medications are discontinued, doses are changed, or new medications (including over-the-counter products) are added. Please carry medication information at all times in case of emergency situations. These are general instructions for a healthy lifestyle:    No smoking/ No tobacco products/ Avoid exposure to second hand smoke. Surgeon General's Warning:  Quitting smoking now greatly reduces serious risk to your health. Obesity, smoking, and a sedentary lifestyle greatly increase your risk for illness. A healthy diet, regular physical exercise & weight monitoring are important for maintaining a healthy lifestyle  You may be retaining fluid if you have a history of heart failure or if you experience any of the following symptoms:  Weight gain of 3 pounds or more overnight or 5 pounds in a week, increased swelling in our hands or feet or shortness of breath while lying flat in bed. Please call your doctor as soon as you notice any of these symptoms; do not wait until your next office visit.     Recognize signs and symptoms of STROKE:  F  -  Face looks uneven  A  -  Arms unable to move or move unevenly  S  -  Speech slurred or non-existent  T  -  Time to call 911 - as soon as signs and symptoms begin - DO NOT go back to bed or wait to see If you get better - TIME IS BRAIN. Colorectal Screening  Colorectal cancer almost always develops from precancerous polyps (abnormal growths) in the colon or rectum. Screening tests can find precancerous polyps, so that they can be removed before they turn into cancer. Screening tests can also find colorectal cancer early, when treatment works best.  Speak with your physician about when you should begin screening and how often you should be tested. InfoReachhart Activation    Thank you for requesting access to [facility]. Please follow the instructions below to securely access and download your online medical record. Y[facility]ino allows you to send messages to your doctor, view your test results, renew your prescriptions, schedule appointments, and more. How Do I Sign Up? In your internet browser, go to [email or web address]. Click on the First Time User? Click Here link in the Sign In box. You will see the New Member Sign Up page. Enter your Yazino Access Code exactly as it appears below. You will not need to use this code after youve completed the sign-up process. If you do not sign up before the expiration date, you must request a new code. Yazino Access Code: Activation code not generated  Current Yazino Status: Active (This is the date your Yazino access code will )    Enter the last four digits of your Social Security Number (xxxx) and Date of Birth (mm/dd/yyyy) as indicated and click Submit. You will be taken to the next sign-up page. Create a Yazino ID. This will be your Yazino login ID and cannot be changed, so think of one that is secure and easy to remember. Create a Yazino password. You can change your password at any time. Enter your Password Reset Question and Answer. This can be used at a later time if you forget your password.    Enter your e-mail address. You will receive e-mail notification when new information is available in [address] Click Sign Up. You can now view and download portions of your medical record. Click the [employer] link to download a portable copy of your medical information. Additional Information    If you have questions, please call [phone]. Remember, [employer] is NOT to be used for urgent needs. For medical emergencies, dial 911. Educational references and/or instructions provided during this visit included:    See Attached      APPOINTMENTS:    Per MD Instruction    Discharge information has been reviewed with the patient. The patient verbalized understanding.

## 2023-01-16 NOTE — ANESTHESIA PREPROCEDURE EVALUATION
Relevant Problems   No relevant active problems       Anesthetic History   No history of anesthetic complications            Review of Systems / Medical History  Patient summary reviewed and pertinent labs reviewed    Pulmonary                   Neuro/Psych              Cardiovascular                       GI/Hepatic/Renal                Endo/Other    Diabetes: type 2    Arthritis     Other Findings              Physical Exam    Airway  Mallampati: II  TM Distance: 4 - 6 cm  Neck ROM: normal range of motion   Mouth opening: Diminished (comment)     Cardiovascular    Rhythm: regular           Dental    Dentition: Poor dentition     Pulmonary  Breath sounds clear to auscultation               Abdominal  GI exam deferred       Other Findings            Anesthetic Plan    ASA: 2  Anesthesia type: MAC          Induction: Intravenous  Anesthetic plan and risks discussed with: Patient

## 2023-01-16 NOTE — H&P
[email or web address]  [phone]      Impression:   1. Average risk colon cancer screening exam      Plan:     1.  Colonoscopy      Addendum: All lab tests orders pertaining to the procedure have been ordered by the anesthesia personnel and results will be addressed by the anesthesia team    Chief Complaint: Average risk colon cancer screening exam.      HPI:  Clary Manley is a 63 y.o. female who is being seen on consult for average risk colon cancer screening with colonoscopy    PMH:   Past Medical History:   Diagnosis Date    Anxiety     Arthritis     bilateral knees and back    Cervicalgia     Decreased vision in both eyes     Diabetes ([address] 1993    H/O acute pancreatitis     Ill-defined condition     Legally blind     Migraines     Protein calorie malnutrition ([facility]     Right leg DVT ([facility]     age 18    Stroke ([facility] [address] [facility] 2007    no residual     TIA (transient ischemic attack) 2017    no residual    Unintentional weight loss     Vertigo        PSH:   Past Surgical History:   Procedure Laterality Date    HX BACK SURGERY      2 slipped disc with pinched spinal cord    HX CATARACT REMOVAL Right 10/30/2019    HX CATARACT REMOVAL Right 10/30/2019    HX ORTHOPAEDIC Left     HX ORTHOPAEDIC  06/2021    neck surgery     HX OTHER SURGICAL      left hand    HX TUBAL LIGATION         Social HX:   Social History     Socioeconomic History    Marital status:      Spouse name: Not on file    Number of children: Not on file    Years of education: Not on file    Highest education level: Not on file   Occupational History    Not on file   Tobacco Use    Smoking status: Former    Smokeless tobacco: Never    Tobacco comments:     in early 25s   Vaping Use    Vaping Use: Never used   Substance and Sexual Activity    Alcohol use: No    Drug use: No    Sexual activity: Not on file   Other Topics Concern     Service Not Asked    Blood Transfusions Not Asked    Caffeine Concern Not Asked    Occupational Exposure Not Asked Hobby Hazards Not Asked    Sleep Concern Not Asked    Stress Concern Not Asked    Weight Concern Not Asked    Special Diet Not Asked    Back Care Not Asked    Exercise Not Asked    Bike Helmet Not Asked    Seat Belt Not Asked    Self-Exams Not Asked   Social History Narrative    Not on file     Social Determinants of Health     Financial Resource Strain: Not on file   Food Insecurity: Not on file   Transportation Needs: Not on file   Physical Activity: Not on file   Stress: Not on file   Social Connections: Not on file   Intimate Partner Violence: Not on file   Housing Stability: Not on file       FHX:   Family History   Problem Relation Age of Onset    Diabetes Mother     Heart Disease Father     Diabetes Brother     Diabetes Brother     Heart Disease Maternal Grandfather        Allergy:   Allergies   Allergen Reactions    Latex Hives    Albuterol Sulfate Swelling    Aspirin Anaphylaxis    Bee Venom Protein (Honey Bee) Other (comments)     Tongue swells, throat gets tight    Benadr [Diphenhydramine Hcl] Anaphylaxis    Morphine Anaphylaxis    Pcn [Penicillins] Anaphylaxis    Ciprofloxacin Unknown (comments)    Demerol [Meperidine] Unknown (comments)    Erythromycin Base Unknown (comments)    Flexeril [Cyclobenzaprine] Unknown (comments)    Iodine And Iodide Containing Products Unknown (comments)    Keflex [Cephalexin] Unknown (comments)    Macrobid [Nitrofurantoin Monohyd/M-Cryst] Unknown (comments)    Macrodantin [Nitrofurantoin Macrocrystalline] Unknown (comments)    Motrin [Ibuprofen] Unknown (comments)    Nitroglycerin Unknown (comments)    Nsaids (Non-Steroidal Anti-Inflammatory Drug) Unknown (comments)    Prednisone Unknown (comments)    Sulfamethoxazole Unknown (comments)    Tramadol Unknown (comments)    Vicodin [Hydrocodone-Acetaminophen] Rash and Swelling    Zithromax [Azithromycin] Unknown (comments)       Home Medications:     Medications Prior to Admission   Medication Sig    docusate sodium (COLACE) 100 mg capsule Take 100 mg by mouth in the morning. polyethylene glycol (MIRALAX) 17 gram/dose powder Take 17 g by mouth as needed. insulin glargine (LANTUS,BASAGLAR) 100 unit/mL (3 mL) inpn 30 Units by SubCUTAneous route daily. Daughter Jamshid Mendoza gives patient her Basaglar every morning around 0930. Indications: type 2 diabetes mellitus       Review of Systems:     Constitutional: No fevers, chills, weight loss, fatigue. Skin: No rashes, pruritis, jaundice, ulcerations, erythema. HENT: No headaches, nosebleeds, sinus pressure, rhinorrhea, sore throat. Eyes: No visual changes, blurred vision, eye pain, photophobia, jaundice. Cardiovascular: No chest pain, heart palpitations. Respiratory: No cough, SOB, wheezing, chest discomfort, orthopnea. Gastrointestinal: Neg unless noted otherwise in H&P   Genitourinary: No dysuria, bleeding, discharge, pyuria. Musculoskeletal: No weakness, arthralgias, wasting. Endo: No sweats. Heme: No bruising, easy bleeding. Allergies: As noted. Neurological: Cranial nerves intact. Alert and oriented. Gait not assessed. Psychiatric:  No anxiety, depression, hallucinations. Visit Vitals  BP (!) 150/72   Pulse 84   Temp 97.7 °F (36.5 °C)   Resp 19   Ht 5' 4\" (1.626 m)   Wt 64.9 kg (143 lb)   SpO2 97%   Breastfeeding No   BMI 24.55 kg/m²       Physical Assessment:     constitutional: appearance: well developed, well nourished, normal habitus, no deformities, in no acute distress. skin: inspection: no rashes, ulcers, icterus or other lesions; no clubbing or telangiectasias. palpation: no induration or subcutaneos nodules. eyes: inspection: normal conjunctivae and lids; no jaundice pupils: symmetrical, normoreactive to light, normal accommodation and size. ENMT: mouth: normal oral mucosa,lips and gums; good dentition. oropharynx: normal tongue, hard and soft palate; posterior pharynx without erithema, exudate or lesions.    neck: thyroid: normal size, consistency and position; no masses or tenderness. respiratory: effort: normal chest excursion; no intercostal retraction or accessory muscle use. cardiovascular: abdominal aorta: normal size and position; no bruits. palpation: PMI of normal size and position; normal rhythm; no thrill or murmurs. abdominal: abdomen: normal consistency; no tenderness or masses. hernias: no hernias appreciated. liver: normal size and consistency. spleen: not palpable. rectal: hemoccult/guaiac: not performed. musculoskeletal: digits and nails: no clubbing, cyanosis, petechiae or other inflammatory conditions. gait: normal gait and station head and neck: normal range of motion; no pain, crepitation or contracture. spine/ribs/pelvis: normal range of motion; no pain, deformity or contracture. lymphatic: axilae: not palpable. groin: not palpable. neck: within normal limits. other: not palpable. neurologic: cranial nerves: II-XII normal.   psychiatric: judgement/insight: within normal limits. memory: within normal limits for recent and remote events. mood and affect: no evidence of depression, anxiety or agitation. orientation: oriented to time, space and person. Basic Metabolic Profile   No results for input(s): NA, K, CL, CO2, BUN, GLU, CA, MG, PHOS in the last 72 hours. No lab exists for component: CREAT      CBC w/Diff    No results for input(s): WBC, RBC, HGB, HCT, MCV, MCH, MCHC, RDW, PLT, HGBEXT, HCTEXT, PLTEXT in the last 72 hours. No lab exists for component: MPV No results for input(s): GRANS, LYMPH, EOS, PRO, MYELO, METAS, BLAST in the last 72 hours. No lab exists for component: MONO, BASO     Hepatic Function   No results for input(s): ALB, TP, TBILI, AP, AML, LPSE in the last 72 hours. No lab exists for component: DBILI, GPT, SGOT       John Mackey MD, M.D. [facility], [address] [place] [address]  [email or web address]

## 2023-01-16 NOTE — PROCEDURES
[email or web address]  [place], [address]      Brief Procedure Note    Shaheed Pichardo  1960  [id number]    Date of Procedure: 1/16/2023    Preoperative diagnosis: Constipation, unspecified constipation type [K59.00]  Colon cancer screening [Z12.11]    Postoperative diagnosis: Ascending Colon Polyp x 1    Type of Anesthesia: MAC (Monitored anesthesia care)    Description of findings: same as post op dx    Procedure: Procedure(s):  COLONOSCOPY with Polypectomy    :  Dr. Marily Bradley MD    Assistant(s): Endoscopy RN-1: Travis Kelly RN; Kathy Hubbard RN  Float Staff: Randi Yu RN    Devices/implants/grafts/tissues/prosthesis: None    EBL:None    Specimens:   ID Type Source Tests Collected by Time Destination   1 : Ascending Polyp Preservative Colon, Ascending  Ayaz King MD 1/16/2023 4429 Pathology       Findings: See printed and scanned procedure note    Complications: None    Dr. Marily Bradley MD  1/16/2023  8:17 AM

## 2023-02-01 RX ORDER — PSEUDOEPHEDRINE HCL 30 MG
100 TABLET ORAL DAILY
COMMUNITY

## 2023-02-01 RX ORDER — INSULIN GLARGINE 100 [IU]/ML
30 INJECTION, SOLUTION SUBCUTANEOUS DAILY
COMMUNITY

## 2023-02-01 RX ORDER — POLYETHYLENE GLYCOL 3350 17 G/17G
17 POWDER, FOR SOLUTION ORAL PRN
COMMUNITY

## 2023-04-04 ENCOUNTER — COMPREHENSIVE EXAM (OUTPATIENT)
Dept: URBAN - METROPOLITAN AREA CLINIC 1 | Facility: CLINIC | Age: 63
End: 2023-04-04

## 2023-04-04 DIAGNOSIS — Z79.4: ICD-10-CM

## 2023-04-04 DIAGNOSIS — H10.45: ICD-10-CM

## 2023-04-04 DIAGNOSIS — E11.3593: ICD-10-CM

## 2023-04-04 DIAGNOSIS — H04.123: ICD-10-CM

## 2023-04-04 DIAGNOSIS — Z96.1: ICD-10-CM

## 2023-04-04 DIAGNOSIS — H16.143: ICD-10-CM

## 2023-04-04 PROCEDURE — 99214 OFFICE O/P EST MOD 30 MIN: CPT

## 2023-04-04 ASSESSMENT — VISUAL ACUITY: OS_SC: 20/250

## 2023-04-04 ASSESSMENT — TONOMETRY
OD_IOP_MMHG: 12
OS_IOP_MMHG: 12

## 2024-02-05 ENCOUNTER — FOLLOW UP (OUTPATIENT)
Dept: URBAN - METROPOLITAN AREA CLINIC 1 | Facility: CLINIC | Age: 64
End: 2024-02-05

## 2024-02-05 DIAGNOSIS — H16.143: ICD-10-CM

## 2024-02-05 DIAGNOSIS — H04.123: ICD-10-CM

## 2024-02-05 DIAGNOSIS — Z79.4: ICD-10-CM

## 2024-02-05 DIAGNOSIS — E11.3593: ICD-10-CM

## 2024-02-05 DIAGNOSIS — Z96.1: ICD-10-CM

## 2024-02-05 PROCEDURE — 92014 COMPRE OPH EXAM EST PT 1/>: CPT

## 2024-02-05 ASSESSMENT — TONOMETRY
OS_IOP_MMHG: 12
OD_IOP_MMHG: 12

## 2024-02-05 ASSESSMENT — VISUAL ACUITY: OS_SC: 20/200

## 2024-03-22 PROBLEM — R41.82 ALTERED MENTAL STATUS, UNSPECIFIED: Status: ACTIVE | Noted: 2024-03-22

## 2024-04-11 RX ORDER — LINEZOLID 600 MG/1
600 TABLET, FILM COATED ORAL 2 TIMES DAILY
COMMUNITY
Start: 2024-04-09 | End: 2024-04-16

## 2024-04-11 NOTE — PROGRESS NOTES
Pressure/Heart medications.    Any questions regarding prep, please call the office at [phone].    For any questions or concerns on the day of procedure, please call the Endo Suite at [phone].    These surgical instructions were reviewed with Cynthia Chairez during the PAT phone call.

## 2024-04-21 ENCOUNTER — ANESTHESIA EVENT (OUTPATIENT)
Facility: HOSPITAL | Age: 64
End: 2024-04-21
Payer: MEDICARE

## 2024-04-23 ENCOUNTER — HOSPITAL ENCOUNTER (OUTPATIENT)
Facility: HOSPITAL | Age: 64
Setting detail: OUTPATIENT SURGERY
Discharge: HOME | End: 2024-04-23
Attending: INTERNAL MEDICINE | Admitting: INTERNAL MEDICINE
Payer: MEDICARE

## 2024-04-23 ENCOUNTER — ANESTHESIA (OUTPATIENT)
Facility: HOSPITAL | Age: 64
End: 2024-04-23
Payer: MEDICARE

## 2024-04-23 VITALS
RESPIRATION RATE: 20 BRPM | HEART RATE: 77 BPM | SYSTOLIC BLOOD PRESSURE: 166 MMHG | HEIGHT: 64 IN | TEMPERATURE: 98.1 F | DIASTOLIC BLOOD PRESSURE: 77 MMHG | WEIGHT: 146.5 LBS | BODY MASS INDEX: 25.01 KG/M2 | OXYGEN SATURATION: 99 %

## 2024-04-23 LAB
GLUCOSE BLD STRIP.AUTO-MCNC: 218 MG/DL (ref 70–110)
GLUCOSE BLD STRIP.AUTO-MCNC: 238 MG/DL (ref 70–110)

## 2024-04-23 PROCEDURE — 82962 GLUCOSE BLOOD TEST: CPT

## 2024-04-23 PROCEDURE — 3600007502: Performed by: INTERNAL MEDICINE

## 2024-04-23 PROCEDURE — 7100000010 HC PHASE II RECOVERY - FIRST 15 MIN: Performed by: INTERNAL MEDICINE

## 2024-04-23 PROCEDURE — 6360000002 HC RX W HCPCS: Performed by: NURSE ANESTHETIST, CERTIFIED REGISTERED

## 2024-04-23 PROCEDURE — 2709999900 HC NON-CHARGEABLE SUPPLY: Performed by: INTERNAL MEDICINE

## 2024-04-23 PROCEDURE — 7100000000 HC PACU RECOVERY - FIRST 15 MIN: Performed by: INTERNAL MEDICINE

## 2024-04-23 PROCEDURE — 7100000011 HC PHASE II RECOVERY - ADDTL 15 MIN: Performed by: INTERNAL MEDICINE

## 2024-04-23 PROCEDURE — 2500000003 HC RX 250 WO HCPCS: Performed by: NURSE ANESTHETIST, CERTIFIED REGISTERED

## 2024-04-23 PROCEDURE — 3700000000 HC ANESTHESIA ATTENDED CARE: Performed by: INTERNAL MEDICINE

## 2024-04-23 PROCEDURE — 88305 TISSUE EXAM BY PATHOLOGIST: CPT

## 2024-04-23 PROCEDURE — 2580000003 HC RX 258: Performed by: NURSE ANESTHETIST, CERTIFIED REGISTERED

## 2024-04-23 RX ORDER — SODIUM CHLORIDE, SODIUM LACTATE, POTASSIUM CHLORIDE, CALCIUM CHLORIDE 600; 310; 30; 20 MG/100ML; MG/100ML; MG/100ML; MG/100ML
INJECTION, SOLUTION INTRAVENOUS CONTINUOUS
Status: DISCONTINUED | OUTPATIENT
Start: 2024-04-23 | End: 2024-04-23 | Stop reason: HOSPADM

## 2024-04-23 RX ORDER — DEXTROSE MONOHYDRATE 100 MG/ML
INJECTION, SOLUTION INTRAVENOUS CONTINUOUS PRN
Status: DISCONTINUED | OUTPATIENT
Start: 2024-04-23 | End: 2024-04-23 | Stop reason: HOSPADM

## 2024-04-23 RX ORDER — FAMOTIDINE 20 MG/1
20 TABLET, FILM COATED ORAL ONCE
Status: DISCONTINUED | OUTPATIENT
Start: 2024-04-23 | End: 2024-04-23 | Stop reason: HOSPADM

## 2024-04-23 RX ORDER — SIMETHICONE 40MG/0.6ML
SUSPENSION, DROPS(FINAL DOSAGE FORM)(ML) ORAL
Status: DISCONTINUED
Start: 2024-04-23 | End: 2024-04-23 | Stop reason: HOSPADM

## 2024-04-23 RX ORDER — INSULIN LISPRO 100 [IU]/ML
1-15 INJECTION, SOLUTION INTRAVENOUS; SUBCUTANEOUS ONCE
Status: DISCONTINUED | OUTPATIENT
Start: 2024-04-23 | End: 2024-04-23 | Stop reason: HOSPADM

## 2024-04-23 RX ORDER — LIDOCAINE HYDROCHLORIDE 10 MG/ML
1 INJECTION, SOLUTION EPIDURAL; INFILTRATION; INTRACAUDAL; PERINEURAL
Status: DISCONTINUED | OUTPATIENT
Start: 2024-04-23 | End: 2024-04-23 | Stop reason: HOSPADM

## 2024-04-23 RX ADMIN — PROPOFOL 100 MG: 10 INJECTION, EMULSION INTRAVENOUS at 07:33

## 2024-04-23 RX ADMIN — SODIUM CHLORIDE, POTASSIUM CHLORIDE, SODIUM LACTATE AND CALCIUM CHLORIDE: 600; 310; 30; 20 INJECTION, SOLUTION INTRAVENOUS at 06:56

## 2024-04-23 RX ADMIN — LIDOCAINE HYDROCHLORIDE 50 MG: 20 INJECTION, SOLUTION EPIDURAL; INFILTRATION; INTRACAUDAL; PERINEURAL at 07:33

## 2024-04-23 ASSESSMENT — PAIN - FUNCTIONAL ASSESSMENT
PAIN_FUNCTIONAL_ASSESSMENT: NONE - DENIES PAIN
PAIN_FUNCTIONAL_ASSESSMENT: 0-10

## 2024-04-23 ASSESSMENT — PAIN SCALES - GENERAL
PAINLEVEL_OUTOF10: 0

## 2024-04-23 NOTE — DISCHARGE INSTRUCTIONS
Sigmoidoscopy: What to Expect at Home  Your Recovery     A sigmoidoscopy lets your doctor look inside the lower part of your large intestine. This is also called the colon. The doctor uses a lighted tube called a sigmoidoscope (or scope).  This test allows the doctor to look for small growths (called polyps), cancer, bleeding, hemorrhoids, or other problems. The doctor also may have used the scope to remove polyps. Or they may have used it to take tissue samples that need to be tested.  You shouldn't have any pain after the procedure. But it is normal to pass gas. You may have mild discomfort from having gas.  If your doctor removed polyps, you will likely need to schedule a colonoscopy to look at the whole colon.  This care sheet gives you a general idea about how long it will take for you to recover. But each person recovers at a different pace. Follow the steps below to get better as quickly as possible.  How can you care for yourself at home?  Activity    Most people are able to return to work right away unless they have had a sedative during the procedure.     You may need someone to drive you home if you have had a sedative. In most cases, you can drive yourself home.   Diet    You can eat your normal diet.     Be sure to drink plenty of liquids to replace those you have lost during the preparation for the procedure.   Exercise    You can return to normal exercise right away.   Medicine    Your doctor will tell you if and when you can restart your medicines. You also will be given instructions about taking any new medicines.     If you stopped taking aspirin or some other blood thinner, your doctor will tell you when to start taking it again.   Follow-up care is a key part of your treatment and safety. Be sure to make and go to all appointments, and call your doctor if you are having problems. It's also a good idea to know your test results and keep a list of the medicines you take.  When should you call

## 2024-04-23 NOTE — H&P
position; no masses or tenderness.   respiratory: effort: normal chest excursion; no intercostal retraction or accessory muscle use.   cardiovascular: abdominal aorta: normal size and position; no bruits. palpation: PMI of normal size and position; normal rhythm; no thrill or murmurs.   abdominal: abdomen: normal consistency; no tenderness or masses. hernias: no hernias appreciated. liver: normal size and consistency. spleen: not palpable.   rectal: hemoccult/guaiac: not performed.   musculoskeletal: digits and nails: no clubbing, cyanosis, petechiae or other inflammatory conditions. gait: normal gait and station head and neck: normal range of motion; no pain, crepitation or contracture. spine/ribs/pelvis: normal range of motion; no pain, deformity or contracture.   neurologic: cranial nerves: II-XII normal.   psychiatric: judgement/insight: within normal limits. memory: within normal limits for recent and remote events. mood and affect: no evidence of depression, anxiety or agitation. orientation: oriented to time, space and person.        Basic Metabolic Profile   No results for input(s): \"NA\", \"K\", \"CL\", \"CO2\", \"BUN\", \"GLU\", \"CA\", \"MG\", \"PHOS\" in the last 72 hours.    Invalid input(s): \"CREAT\"      CBC w/Diff    No results for input(s): \"WBC\", \"RBC\", \"HGB\", \"HCT\", \"MCV\", \"MCH\", \"MCHC\", \"RDW\", \"PLT\", \"MPV\" in the last 72 hours. No results for input(s): \"MONO\", \"BASO\", \"PRO\", \"METAS\", \"BLAST\" in the last 72 hours.    Invalid input(s): \"GRANS\", \"LYMPH\", \"EOS\", \"MYELO\"     Hepatic Function   No results for input(s): \"ALB\", \"TP\", \"AML\" in the last 72 hours.    Invalid input(s): \"DBILI\", \"TBILI\", \"GPT\", \"SGOT\", \"AP\", \"LPSE\"     Coags   No results for input(s): \"INR\", \"APTT\" in the last 72 hours.    Invalid input(s): \"PTP\"        Clive Kat MD  [facility]  [facility]  [phone]  [email or web address]

## 2024-04-23 NOTE — ANESTHESIA POSTPROCEDURE EVALUATION
Department of Anesthesiology  Postprocedure Note    Patient: Cynthia Chairez  MRN: [id number]  YOB: 1960  Date of evaluation: 4/23/2024    Procedure Summary       Date: 04/23/24 Room / Location: [facility] ENDO 02 / [facility] ENDOSCOPY    Anesthesia Start: 0729 Anesthesia Stop: 0743    Procedure: FLEXIBLE SIGMOIDOSCOPY (Abdomen) Diagnosis:       Constipation, unspecified constipation type      Polyp of colon, unspecified part of colon, unspecified type      Abnormal CT of the abdomen      Overweight (BMI 25.0-29.9)      (Constipation, unspecified constipation type [K59.00])      (Polyp of colon, unspecified part of colon, unspecified type [K63.5])      (Abnormal CT of the abdomen [R93.5])      (Overweight (BMI 25.0-29.9) [E66.3])    Surgeons: Clive Kat MD Responsible Provider: Luigi Clements Jr., MD    Anesthesia Type: MAC ASA Status: 3            Anesthesia Type: MAC    Mercedez Phase I: Mercedez Score: 10    Mercedez Phase II: Mercedez Score: 10    Anesthesia Post Evaluation    Patient location during evaluation: bedside  Airway patency: patent  Cardiovascular status: hemodynamically stable  Respiratory status: acceptable  Hydration status: stable  Pain management: adequate    No notable events documented.

## 2024-04-23 NOTE — BRIEF OP NOTE
[phone]    [facility]  [address] [place] [id number]      Brief Procedure Note    Cynthia Chairez  1960  [id number]    Date of Procedure: 4/23/2024     Preoperative diagnosis: Constipation, unspecified constipation type [K59.00]  Polyp of colon, unspecified part of colon, unspecified type [K63.5]  Abnormal CT of the abdomen [R93.5]  Overweight (BMI 25.0-29.9) [E66.3]    Postoperative diagnosis: Constipation, unspecified constipation type [K59.00]  Polyp of colon, unspecified part of colon, unspecified type [K63.5]  Abnormal CT of the abdomen [R93.5]  Overweight (BMI 25.0-29.9) [E66.3]    Type of Anesthesia: MAC (Monitored anesthesia care)    Description of findings: same as post op dx    Procedure: Procedure(s):  FLEXIBLE SIGMOIDOSCOPY    :  Dr. Clive Kat MD    Assistant(s): Circulator: Bassem Suárez RN; Sylvain Cornejo RN  Endoscopy Technician: Karol Rainey    Devices/implants/grafts/tissues/prosthesis: None    EBL:None    Specimens:   ID Type Source Tests Collected by Time Destination   A : Sigmoid bxs Tissue Sigmoid Colon SURGICAL PATHOLOGY Clive Kat MD 4/23/2024 0737        Findings: See printed and scanned procedure note    Complications: None    Dr. Clive Kat MD  4/23/2024  7:44 AM

## 2024-04-23 NOTE — PERIOP NOTE
Blood glucose levels was 238. Patient does not want insulin here.  States that she will take her own when she gets home.

## 2025-06-10 ENCOUNTER — COMPREHENSIVE EXAM (OUTPATIENT)
Age: 65
End: 2025-06-10

## 2025-06-10 DIAGNOSIS — E11.3593: ICD-10-CM

## 2025-06-10 DIAGNOSIS — H33.43: ICD-10-CM

## 2025-06-10 DIAGNOSIS — H04.123: ICD-10-CM

## 2025-06-10 DIAGNOSIS — H10.45: ICD-10-CM

## 2025-06-10 DIAGNOSIS — H16.143: ICD-10-CM

## 2025-06-10 DIAGNOSIS — Z79.4: ICD-10-CM

## 2025-06-10 DIAGNOSIS — Z96.1: ICD-10-CM

## 2025-06-10 PROCEDURE — 99214 OFFICE O/P EST MOD 30 MIN: CPT

## 2025-07-03 ENCOUNTER — EMERGENCY VISIT (OUTPATIENT)
Age: 65
End: 2025-07-03

## 2025-07-03 DIAGNOSIS — H16.143: ICD-10-CM

## 2025-07-03 DIAGNOSIS — H04.123: ICD-10-CM

## 2025-07-03 PROCEDURE — 99214 OFFICE O/P EST MOD 30 MIN: CPT

## (undated) DEVICE — GAUZE,SPONGE,4"X4",16PLY,STRL,LF,10/TRAY: Brand: MEDLINE

## (undated) DEVICE — SYRINGE 20ML LL S/C 50

## (undated) DEVICE — LINER SUCT CANSTR 3000CC PLAS SFT PRE ASSEMB W/OUT TBNG W/

## (undated) DEVICE — TRAP SPEC POLYP REM STRNR CLN DSGN MAGNIFYING WIND DISP

## (undated) DEVICE — GOWN PLASTIC FILM THMBHKS UNIV BLUE: Brand: CARDINAL HEALTH

## (undated) DEVICE — CANNULA ORIG TL CLR W FOAM CUSHIONS AND 14FT SUPL TB 3 CHN

## (undated) DEVICE — CANNULA NSL AD TBNG L14FT STD PVC O2 CRV CONN NONFLARED NSL

## (undated) DEVICE — MEDI-VAC NON-CONDUCTIVE SUCTION TUBING: Brand: CARDINAL HEALTH

## (undated) DEVICE — SNARE POLYP M W27MMXL240CM OVL STIFF DISP CAPTIVATOR

## (undated) DEVICE — SYRINGE MED 10ML LUERLOCK TIP W/O SFTY DISP

## (undated) DEVICE — FORCEPS BX L240CM JAW DIA2.8MM L CAP W/ NDL MIC MESH TOOTH

## (undated) DEVICE — CATHETER SUCT TR FL TIP 14FR W/ O CTRL

## (undated) DEVICE — ENDOSCOPY PUMP TUBING/ CAP SET: Brand: ERBE

## (undated) DEVICE — SYRINGE MED 50ML LUERSLIP TIP

## (undated) DEVICE — SYRINGE MED 25GA 3ML L5/8IN SUBQ PLAS W/ DETACH NDL SFTY

## (undated) DEVICE — SOLUTION IRRIG 1000ML H2O STRL BLT

## (undated) DEVICE — FLUFF AND POLYMER UNDERPAD,EXTRA HEAVY: Brand: WINGS

## (undated) DEVICE — YANKAUER,SMOOTH HANDLE,HIGH CAPACITY: Brand: MEDLINE INDUSTRIES, INC.

## (undated) DEVICE — UNDERPAD INCONT W23XL36IN STD BLU POLYPR BK FLUF SFT